# Patient Record
Sex: FEMALE | Race: ASIAN | NOT HISPANIC OR LATINO | URBAN - METROPOLITAN AREA
[De-identification: names, ages, dates, MRNs, and addresses within clinical notes are randomized per-mention and may not be internally consistent; named-entity substitution may affect disease eponyms.]

---

## 2017-01-17 ENCOUNTER — EMERGENCY (EMERGENCY)
Facility: HOSPITAL | Age: 27
LOS: 1 days | Discharge: HOME | End: 2017-01-17
Attending: EMERGENCY MEDICINE | Admitting: EMERGENCY MEDICINE
Payer: COMMERCIAL

## 2017-01-17 VITALS
HEART RATE: 71 BPM | OXYGEN SATURATION: 100 % | SYSTOLIC BLOOD PRESSURE: 121 MMHG | DIASTOLIC BLOOD PRESSURE: 83 MMHG | TEMPERATURE: 99 F | RESPIRATION RATE: 15 BRPM

## 2017-01-17 LAB
APTT BLD: 35.3 SEC — SIGNIFICANT CHANGE UP (ref 27.5–37.4)
HCT VFR BLD CALC: 43 % — SIGNIFICANT CHANGE UP (ref 34.5–45)
HGB BLD-MCNC: 14.3 G/DL — SIGNIFICANT CHANGE UP (ref 11.5–15.5)
INR BLD: 1.42 — HIGH (ref 0.87–1.18)
MCHC RBC-ENTMCNC: 28.9 PG — SIGNIFICANT CHANGE UP (ref 27–34)
MCHC RBC-ENTMCNC: 33.3 % — SIGNIFICANT CHANGE UP (ref 32–36)
MCV RBC AUTO: 87 FL — SIGNIFICANT CHANGE UP (ref 80–100)
PLATELET # BLD AUTO: 243 K/UL — SIGNIFICANT CHANGE UP (ref 150–400)
PMV BLD: 9.4 FL — SIGNIFICANT CHANGE UP (ref 7–13)
PROTHROM AB SERPL-ACNC: 16.2 SEC — HIGH (ref 10–13.1)
RBC # BLD: 4.94 M/UL — SIGNIFICANT CHANGE UP (ref 3.8–5.2)
RBC # FLD: 12.5 % — SIGNIFICANT CHANGE UP (ref 10.3–14.5)
WBC # BLD: 7.6 K/UL — SIGNIFICANT CHANGE UP (ref 3.8–10.5)
WBC # FLD AUTO: 7.6 K/UL — SIGNIFICANT CHANGE UP (ref 3.8–10.5)

## 2017-01-17 PROCEDURE — 99283 EMERGENCY DEPT VISIT LOW MDM: CPT

## 2017-01-17 RX ORDER — FLUTICASONE PROPIONATE 50 MCG
2 SPRAY, SUSPENSION NASAL
Qty: 1 | Refills: 0
Start: 2017-01-17 | End: 2017-01-24

## 2017-01-17 NOTE — ED PROVIDER NOTE - NS ED MD SCRIBE ATTENDING SCRIBE SECTIONS
HISTORY OF PRESENT ILLNESS/REVIEW OF SYSTEMS/PAST MEDICAL/SURGICAL/SOCIAL HISTORY/DISPOSITION/HIV/PHYSICAL EXAM/VITAL SIGNS( Pullset)

## 2017-01-17 NOTE — ED PROVIDER NOTE - OBJECTIVE STATEMENT
26 y/o F pt with no significant PMHx c/o intermittent nosebleeds x6 days since she seen her ENT with associated dizziness and nose pain. Currently not bleeding. Pt saw ENT associated with [facility] in the [facility], Dr. Lachman Leigh [phone] during the onset of the sx because she had hx of nose bleeds and inability to breath through her nose. States ENT numbed the area and cleaned it to see the reason for nosebleeds. Notes that the nosebleeds increased since the visit with suspected blood clots. Was Rx'd mupirocin, afrin, nasal saline, cefuroxime. Pt called ENT and referred pt to the ED for a CT scan and bleeding blood test. Pt dips a cotton ball in afrin and dips it in her nose and leans forward during her nosebleeds as instructed by ENT. Pt gets heavy menstruation periods up to 7 days, about 2 pads a day. Denies CP, chance of pregnancy or any other complaints. NKDA

## 2017-01-17 NOTE — ED PROVIDER NOTE - CARE PLAN
Principal Discharge DX:	Epistaxis  Instructions for follow-up, activity and diet:	Follow up with our ENT clinic within 48 hours for further evaluation. Call tomorrow to set up the appointment to be seen, they are aware you will be calling. [phone]. Take a copy of all lab results to be reviewed.  Take Flonase 2 sprays per nostril daily.  If any recurrent bleeding, worsening, concerning, or new signs or symptoms return to the ER

## 2017-01-17 NOTE — ED PROVIDER NOTE - PLAN OF CARE
Follow up with our ENT clinic within 48 hours for further evaluation. Call tomorrow to set up the appointment to be seen, they are aware you will be calling. [phone]. Take a copy of all lab results to be reviewed.  Take Flonase 2 sprays per nostril daily.  If any recurrent bleeding, worsening, concerning, or new signs or symptoms return to the ER

## 2017-01-17 NOTE — ED PROVIDER NOTE - PROGRESS NOTE DETAILS
DR bloch- Discussed with ENT to f/u in clinic for MRI. continue present meds. DR bloch- Discussed with ENT to f/u in clinic for MRI. continue present meds and add flonase Rwd labs with Dr bloch, ok for pt to fu in ENT clinic/flonase to pharmacy. No active bleeding.
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
## 2017-01-17 NOTE — ED PROVIDER NOTE - MEDICAL DECISION MAKING DETAILS
28 y/o F pt presents to the ED with intermittent nosebleeds x6 days. Reoccurring nose bleeds. Consider checking platelets, PT, PTT, coagulapathy, contact ENT for usage of nasal steroids and ENT f/u

## 2017-01-17 NOTE — ED ADULT TRIAGE NOTE - CHIEF COMPLAINT QUOTE
c/o on and off nosebleeds for 3 weeks , not actively bleeding now. seen ENT 4 days ago, advised to come to ED for further eval . denies pmh

## 2017-02-06 ENCOUNTER — EMERGENCY (EMERGENCY)
Facility: HOSPITAL | Age: 27
LOS: 1 days | Discharge: HOME | End: 2017-02-06
Attending: EMERGENCY MEDICINE | Admitting: EMERGENCY MEDICINE
Payer: COMMERCIAL

## 2017-02-06 VITALS — TEMPERATURE: 98 F | OXYGEN SATURATION: 97 %

## 2017-02-06 VITALS
SYSTOLIC BLOOD PRESSURE: 108 MMHG | RESPIRATION RATE: 18 BRPM | HEART RATE: 89 BPM | DIASTOLIC BLOOD PRESSURE: 74 MMHG | OXYGEN SATURATION: 100 %

## 2017-02-06 LAB
ALBUMIN SERPL ELPH-MCNC: 4.4 G/DL — SIGNIFICANT CHANGE UP (ref 3.3–5)
ALP SERPL-CCNC: 58 U/L — SIGNIFICANT CHANGE UP (ref 40–120)
ALT FLD-CCNC: 14 U/L — SIGNIFICANT CHANGE UP (ref 4–33)
APPEARANCE UR: CLEAR — SIGNIFICANT CHANGE UP
APTT BLD: 35.1 SEC — SIGNIFICANT CHANGE UP (ref 27.5–37.4)
AST SERPL-CCNC: 18 U/L — SIGNIFICANT CHANGE UP (ref 4–32)
BASOPHILS # BLD AUTO: 0 K/UL — SIGNIFICANT CHANGE UP (ref 0–0.2)
BASOPHILS NFR BLD AUTO: 0 % — SIGNIFICANT CHANGE UP (ref 0–2)
BILIRUB SERPL-MCNC: 0.4 MG/DL — SIGNIFICANT CHANGE UP (ref 0.2–1.2)
BILIRUB UR-MCNC: NEGATIVE — SIGNIFICANT CHANGE UP
BLOOD UR QL VISUAL: NEGATIVE — SIGNIFICANT CHANGE UP
BUN SERPL-MCNC: 12 MG/DL — SIGNIFICANT CHANGE UP (ref 7–23)
CALCIUM SERPL-MCNC: 9.6 MG/DL — SIGNIFICANT CHANGE UP (ref 8.4–10.5)
CHLORIDE SERPL-SCNC: 103 MMOL/L — SIGNIFICANT CHANGE UP (ref 98–107)
CO2 SERPL-SCNC: 24 MMOL/L — SIGNIFICANT CHANGE UP (ref 22–31)
COLOR SPEC: SIGNIFICANT CHANGE UP
CREAT SERPL-MCNC: 0.64 MG/DL — SIGNIFICANT CHANGE UP (ref 0.5–1.3)
EOSINOPHIL # BLD AUTO: 0.07 K/UL — SIGNIFICANT CHANGE UP (ref 0–0.5)
EOSINOPHIL NFR BLD AUTO: 1.2 % — SIGNIFICANT CHANGE UP (ref 0–6)
GLUCOSE SERPL-MCNC: 85 MG/DL — SIGNIFICANT CHANGE UP (ref 70–99)
GLUCOSE UR-MCNC: NEGATIVE — SIGNIFICANT CHANGE UP
HCT VFR BLD CALC: 42.5 % — SIGNIFICANT CHANGE UP (ref 34.5–45)
HGB BLD-MCNC: 14 G/DL — SIGNIFICANT CHANGE UP (ref 11.5–15.5)
IMM GRANULOCYTES NFR BLD AUTO: 0.2 % — SIGNIFICANT CHANGE UP (ref 0–1.5)
INR BLD: 1.43 — HIGH (ref 0.87–1.18)
KETONES UR-MCNC: NEGATIVE — SIGNIFICANT CHANGE UP
LEUKOCYTE ESTERASE UR-ACNC: NEGATIVE — SIGNIFICANT CHANGE UP
LYMPHOCYTES # BLD AUTO: 1.54 K/UL — SIGNIFICANT CHANGE UP (ref 1–3.3)
LYMPHOCYTES # BLD AUTO: 26.4 % — SIGNIFICANT CHANGE UP (ref 13–44)
MCHC RBC-ENTMCNC: 29 PG — SIGNIFICANT CHANGE UP (ref 27–34)
MCHC RBC-ENTMCNC: 32.9 % — SIGNIFICANT CHANGE UP (ref 32–36)
MCV RBC AUTO: 88.2 FL — SIGNIFICANT CHANGE UP (ref 80–100)
MONOCYTES # BLD AUTO: 0.34 K/UL — SIGNIFICANT CHANGE UP (ref 0–0.9)
MONOCYTES NFR BLD AUTO: 5.8 % — SIGNIFICANT CHANGE UP (ref 2–14)
MUCOUS THREADS # UR AUTO: SIGNIFICANT CHANGE UP
NEUTROPHILS # BLD AUTO: 3.88 K/UL — SIGNIFICANT CHANGE UP (ref 1.8–7.4)
NEUTROPHILS NFR BLD AUTO: 66.4 % — SIGNIFICANT CHANGE UP (ref 43–77)
NITRITE UR-MCNC: NEGATIVE — SIGNIFICANT CHANGE UP
OB PNL STL: NEGATIVE — SIGNIFICANT CHANGE UP
PH UR: 6.5 — SIGNIFICANT CHANGE UP (ref 4.6–8)
PLATELET # BLD AUTO: 281 K/UL — SIGNIFICANT CHANGE UP (ref 150–400)
PMV BLD: 9.7 FL — SIGNIFICANT CHANGE UP (ref 7–13)
POTASSIUM SERPL-MCNC: 3.8 MMOL/L — SIGNIFICANT CHANGE UP (ref 3.5–5.3)
POTASSIUM SERPL-SCNC: 3.8 MMOL/L — SIGNIFICANT CHANGE UP (ref 3.5–5.3)
PROT SERPL-MCNC: 7.6 G/DL — SIGNIFICANT CHANGE UP (ref 6–8.3)
PROT UR-MCNC: NEGATIVE — SIGNIFICANT CHANGE UP
PROTHROM AB SERPL-ACNC: 16.4 SEC — HIGH (ref 10–13.1)
RBC # BLD: 4.82 M/UL — SIGNIFICANT CHANGE UP (ref 3.8–5.2)
RBC # FLD: 12.4 % — SIGNIFICANT CHANGE UP (ref 10.3–14.5)
SODIUM SERPL-SCNC: 140 MMOL/L — SIGNIFICANT CHANGE UP (ref 135–145)
SP GR SPEC: 1.01 — SIGNIFICANT CHANGE UP (ref 1–1.03)
SQUAMOUS # UR AUTO: SIGNIFICANT CHANGE UP
UROBILINOGEN FLD QL: NORMAL E.U. — SIGNIFICANT CHANGE UP (ref 0.1–0.2)
WBC # BLD: 5.84 K/UL — SIGNIFICANT CHANGE UP (ref 3.8–10.5)
WBC # FLD AUTO: 5.84 K/UL — SIGNIFICANT CHANGE UP (ref 3.8–10.5)
WBC UR QL: SIGNIFICANT CHANGE UP (ref 0–?)

## 2017-02-06 PROCEDURE — 99284 EMERGENCY DEPT VISIT MOD MDM: CPT

## 2017-02-06 PROCEDURE — 74177 CT ABD & PELVIS W/CONTRAST: CPT | Mod: 26

## 2017-02-06 RX ORDER — ACETAMINOPHEN 500 MG
650 TABLET ORAL ONCE
Qty: 0 | Refills: 0 | Status: COMPLETED | OUTPATIENT
Start: 2017-02-06 | End: 2017-02-06

## 2017-02-06 RX ADMIN — Medication 650 MILLIGRAM(S): at 21:28

## 2017-02-06 NOTE — ED PROVIDER NOTE - ATTENDING CONTRIBUTION TO CARE
AJM: pt seen with PA and agree with above note. 28 y/o female with no pmhx presents to ED c/o daily episodes of epistaxis x 1 month; no active bleeding. Pt also c/o generalized abdominal pain, worse in RRLQ for two days. No trauma or abd surgeries in the past. + ttp in RLQ on exam. Questionable blood in stool, possibly from chronic epistaxis. Labs, ct a/p, reassess. encourage ent follow up as well as heme/onc follow up given chronic bleeding.

## 2017-02-06 NOTE — ED PROVIDER NOTE - MEDICAL DECISION MAKING DETAILS
26 y/o female with no pmhx presents to ED c/o daily episodes of epistaxis x 1 month. Pt also c/o generalized abdominal pain and melena x 2 days. CBC, CMP, PT/PTT, INR, U/A, ucg, CT with IV and PO contrast, stool guiac. Pt currently stable, most likely outpatient GI, ENT and heme-onc follow up. 28 y/o female with no pmhx presents to ED c/o daily episodes of epistaxis x 1 month; no acitive bleeding. Pt also c/o generalized abdominal pain and melena x 2 days. CBC, CMP, PT/PTT, INR, U/A, ucg, CT with IV and PO contrast, stool guiac. Pt currently stable, most likely outpatient GI, ENT and heme-onc follow up.

## 2017-02-06 NOTE — ED PROVIDER NOTE - OBJECTIVE STATEMENT
28 y/o female with no pmhx presents to ED c/o daily episodes of epistaxis x 1 month. Pt states she was seen in the ER on 1/17/17 for her nose bleeds and followed up with an ENT Dr. Lachman who cauterized her nose three times. Pt states she has been using normal saline to flush her nose, applying pressure and leaning forward as her ENT recommended, however has no relief. Epistaxis episodes last from 10minutes to 1 hour. About 2 episodes per day with bright red blood. Denies any active bleeding, medications taken at home, anticoagulation, palpitations, family hx of bleeding disorders.   Pt also c/o generalized abdominal pain and dark red bloody stools x 2 days. Pt has constant dull lower abdominal pain with intermittent sharp pain. Pain fluctuating between 6/10 and 8/10. Admits to diarrhea once a week x 1 year. LBM was this morning which was regular. Denies fever, chills, CP, heartburn, N/V, dysuria, back pain. LMP 1/22/17, admits to "heavy periods" having to use 3 pads a day and bleeding for 4-7 days. 28 y/o female with no pmhx presents to ED c/o daily episodes of epistaxis x 1 month. Pt states she was seen in the ER on 1/17/17 for her nose bleeds and followed up with an ENT Dr. Lachman who cauterized her nose three times. Pt states she has been using normal saline to flush her nose, applying pressure and leaning forward as her ENT recommended, however has no relief. Epistaxis episodes last from 10minutes to 1 hour. About 1- 2 episodes per day with bright red blood. Denies any active bleeding, medications taken at home, anticoagulation, palpitations, family hx of bleeding disorders.   Pt also c/o generalized abdominal pain and dark red bloody stools x 2 days. Pt has constant dull lower abdominal pain with intermittent sharp pain. Pain fluctuating between 6/10 and 8/10. Admits to diarrhea once a week x 1 year. LBM was this morning which was regular. Denies fever, chills, CP, heartburn, N/V, dysuria, back pain. LMP 1/22/17, admits to "heavy periods" having to use 3 pads a day and bleeding for 4-7 days.

## 2017-02-06 NOTE — ED PROVIDER NOTE - ENMT NEGATIVE STATEMENT, MLM
Ears: no ear pain and no hearing problems. Nose: no nasal congestion and no nasal drainage. Mouth/Throat: no dysphagia, no hoarseness and no throat pain, +epistaxis. Neck: no lumps, no pain, no stiffness and no swollen glands.

## 2017-02-06 NOTE — ED PROVIDER NOTE - CARE PLAN
Instructions for follow-up, activity and diet:	Follow up with your primary care doctor within 48-72 hours. Follow up with gastroenterology, hematology to r/o bleeding disorder and ENT for epistaxis within the next week. Return to ER if experiencing worsening symptoms, fever, chills, vomiting or any other concerns. Principal Discharge DX:	Cyst of right ovary  Instructions for follow-up, activity and diet:	Follow up with your primary care doctor within 48-72 hours. Follow up with gastroenterology, hematology to r/o bleeding disorder and ENT for epistaxis within the next week. Return to ER if experiencing worsening symptoms, fever, chills, vomiting or any other concerns.

## 2017-02-06 NOTE — ED ADULT TRIAGE NOTE - CHIEF COMPLAINT QUOTE
Patient states that she gets 45 minutes of nosebleeds (last one 20 minutes ago). Patient also c/o right sided pelvic pain.

## 2017-02-06 NOTE — ED PROVIDER NOTE - PLAN OF CARE
Follow up with your primary care doctor within 48-72 hours. Follow up with gastroenterology, hematology to r/o bleeding disorder and ENT for epistaxis within the next week. Return to ER if experiencing worsening symptoms, fever, chills, vomiting or any other concerns.

## 2017-02-06 NOTE — ED PROVIDER NOTE - PROGRESS NOTE DETAILS
VINCE Morton amenable to plan of care. Awaiting labs and CT scan results. VINCE PINTO: I have personally seen and examined this patient. I have reviewed and addended the HPI, ROS, PE, and A/P as necessary. I agree with the above plan of care. PA Gerasimou - Pt currently stable and abdominal pain has improved. No active bleeding or epistaxis. Signed out and discussed plan of care with Nathaly. Awaiting CT scan results. VINCE Liu: Pt signed out to me by VINCE Aranda, plan to f/u CT Scan and d/c home if neg with hem/gi/ent  follow up as requested by patient. Patient CT stable , 2.7 R ovarian corpuse luteal cyst. Will also give GYN referral list. Discussed results with patient. Pt stable , no active bleeding, pain improved. Return precautions given. Pt agrees and understands plan.

## 2017-02-06 NOTE — ED PROVIDER NOTE - ABDOMINAL EXAM
soft/+BS. Generalized tenderness on light palpation in all 4 quadrants, tender in RMQ on deep palpation. Negative Acharya's. No rebound tenderness. +guarding soft/+BS. Generalized tenderness on light palpation in all 4 quadrants, tender in RMQ on deep palpation. Negative Acharya's. No rebound tenderness. +guarding/nondistended/tender...

## 2017-02-07 ENCOUNTER — RESULT REVIEW (OUTPATIENT)
Age: 27
End: 2017-02-07

## 2017-02-08 ENCOUNTER — APPOINTMENT (OUTPATIENT)
Dept: HEMATOLOGY ONCOLOGY | Facility: CLINIC | Age: 27
End: 2017-02-08

## 2017-02-08 ENCOUNTER — OUTPATIENT (OUTPATIENT)
Dept: OUTPATIENT SERVICES | Facility: HOSPITAL | Age: 27
LOS: 1 days | Discharge: HOME | End: 2017-02-08

## 2017-02-08 VITALS
TEMPERATURE: 98.1 F | BODY MASS INDEX: 20.44 KG/M2 | HEART RATE: 93 BPM | OXYGEN SATURATION: 100 % | DIASTOLIC BLOOD PRESSURE: 74 MMHG | WEIGHT: 116.82 LBS | RESPIRATION RATE: 16 BRPM | SYSTOLIC BLOOD PRESSURE: 105 MMHG | HEIGHT: 63.39 IN

## 2017-02-08 DIAGNOSIS — Z87.42 PERSONAL HISTORY OF OTHER DISEASES OF THE FEMALE GENITAL TRACT: ICD-10-CM

## 2017-02-08 DIAGNOSIS — R79.1 ABNORMAL COAGULATION PROFILE: ICD-10-CM

## 2017-02-08 LAB
BASOPHILS # BLD AUTO: 0 K/UL — SIGNIFICANT CHANGE UP (ref 0–0.2)
BASOPHILS NFR BLD AUTO: 0.2 % — SIGNIFICANT CHANGE UP (ref 0–2)
EOSINOPHIL # BLD AUTO: 0.2 K/UL — SIGNIFICANT CHANGE UP (ref 0–0.5)
EOSINOPHIL NFR BLD AUTO: 3.5 % — SIGNIFICANT CHANGE UP (ref 0–6)
HCT VFR BLD CALC: 42 % — SIGNIFICANT CHANGE UP (ref 34.5–45)
HGB BLD-MCNC: 13.7 G/DL — SIGNIFICANT CHANGE UP (ref 11.5–15.5)
LYMPHOCYTES # BLD AUTO: 1.4 K/UL — SIGNIFICANT CHANGE UP (ref 1–3.3)
LYMPHOCYTES # BLD AUTO: 26.4 % — SIGNIFICANT CHANGE UP (ref 13–44)
MCHC RBC-ENTMCNC: 28.7 PG — SIGNIFICANT CHANGE UP (ref 27–34)
MCHC RBC-ENTMCNC: 32.6 GM/DL — SIGNIFICANT CHANGE UP (ref 32–36)
MCV RBC AUTO: 87.9 FL — SIGNIFICANT CHANGE UP (ref 80–100)
MONOCYTES # BLD AUTO: 0.4 K/UL — SIGNIFICANT CHANGE UP (ref 0–0.9)
MONOCYTES NFR BLD AUTO: 7.7 % — SIGNIFICANT CHANGE UP (ref 2–14)
NEUTROPHILS # BLD AUTO: 3.4 K/UL — SIGNIFICANT CHANGE UP (ref 1.8–7.4)
NEUTROPHILS NFR BLD AUTO: 62.3 % — SIGNIFICANT CHANGE UP (ref 43–77)
PLATELET # BLD AUTO: 275 K/UL — SIGNIFICANT CHANGE UP (ref 150–400)
RBC # BLD: 4.78 M/UL — SIGNIFICANT CHANGE UP (ref 3.8–5.2)
RBC # FLD: 11.2 % — SIGNIFICANT CHANGE UP (ref 10.3–14.5)
TT CONT PPP: 19.5 SECS
WBC # BLD: 5.4 K/UL — SIGNIFICANT CHANGE UP (ref 3.8–10.5)
WBC # FLD AUTO: 5.4 K/UL — SIGNIFICANT CHANGE UP (ref 3.8–10.5)

## 2017-02-09 ENCOUNTER — APPOINTMENT (OUTPATIENT)
Dept: OTOLARYNGOLOGY | Facility: CLINIC | Age: 27
End: 2017-02-09

## 2017-02-10 LAB
DEPRECATED KAPPA LC FREE/LAMBDA SER: 0.93 RATIO
FACT VII ACT/NOR PPP: 27 %
IGA SER QL IEP: 162 MG/DL
IGG SER QL IEP: 1370 MG/DL
IGM SER QL IEP: 98 MG/DL
KAPPA LC CSF-MCNC: 1.38 MG/DL
KAPPA LC SERPL-MCNC: 1.29 MG/DL
M PROTEIN SPEC IFE-MCNC: NORMAL

## 2017-02-14 ENCOUNTER — APPOINTMENT (OUTPATIENT)
Dept: OBGYN | Facility: CLINIC | Age: 27
End: 2017-02-14

## 2017-02-19 ENCOUNTER — EMERGENCY (EMERGENCY)
Facility: HOSPITAL | Age: 27
LOS: 1 days | Discharge: HOME | End: 2017-02-19
Attending: EMERGENCY MEDICINE | Admitting: EMERGENCY MEDICINE
Payer: COMMERCIAL

## 2017-02-19 VITALS
TEMPERATURE: 98 F | DIASTOLIC BLOOD PRESSURE: 82 MMHG | OXYGEN SATURATION: 100 % | RESPIRATION RATE: 17 BRPM | HEART RATE: 93 BPM | SYSTOLIC BLOOD PRESSURE: 120 MMHG

## 2017-02-19 LAB
BASOPHILS # BLD AUTO: 0.01 K/UL — SIGNIFICANT CHANGE UP (ref 0–0.2)
BASOPHILS NFR BLD AUTO: 0.2 % — SIGNIFICANT CHANGE UP (ref 0–2)
BUN SERPL-MCNC: 13 MG/DL — SIGNIFICANT CHANGE UP (ref 7–23)
CALCIUM SERPL-MCNC: 8.9 MG/DL — SIGNIFICANT CHANGE UP (ref 8.4–10.5)
CHLORIDE SERPL-SCNC: 105 MMOL/L — SIGNIFICANT CHANGE UP (ref 98–107)
CO2 SERPL-SCNC: 19 MMOL/L — LOW (ref 22–31)
CREAT SERPL-MCNC: 0.65 MG/DL — SIGNIFICANT CHANGE UP (ref 0.5–1.3)
EOSINOPHIL # BLD AUTO: 0.09 K/UL — SIGNIFICANT CHANGE UP (ref 0–0.5)
EOSINOPHIL NFR BLD AUTO: 1.5 % — SIGNIFICANT CHANGE UP (ref 0–6)
GLUCOSE SERPL-MCNC: 78 MG/DL — SIGNIFICANT CHANGE UP (ref 70–99)
HCT VFR BLD CALC: 39.4 % — SIGNIFICANT CHANGE UP (ref 34.5–45)
HGB BLD-MCNC: 13.1 G/DL — SIGNIFICANT CHANGE UP (ref 11.5–15.5)
IMM GRANULOCYTES NFR BLD AUTO: 0.2 % — SIGNIFICANT CHANGE UP (ref 0–1.5)
LYMPHOCYTES # BLD AUTO: 1.59 K/UL — SIGNIFICANT CHANGE UP (ref 1–3.3)
LYMPHOCYTES # BLD AUTO: 26.3 % — SIGNIFICANT CHANGE UP (ref 13–44)
MCHC RBC-ENTMCNC: 29.2 PG — SIGNIFICANT CHANGE UP (ref 27–34)
MCHC RBC-ENTMCNC: 33.2 % — SIGNIFICANT CHANGE UP (ref 32–36)
MCV RBC AUTO: 87.9 FL — SIGNIFICANT CHANGE UP (ref 80–100)
MONOCYTES # BLD AUTO: 0.3 K/UL — SIGNIFICANT CHANGE UP (ref 0–0.9)
MONOCYTES NFR BLD AUTO: 5 % — SIGNIFICANT CHANGE UP (ref 2–14)
NEUTROPHILS # BLD AUTO: 4.04 K/UL — SIGNIFICANT CHANGE UP (ref 1.8–7.4)
NEUTROPHILS NFR BLD AUTO: 66.8 % — SIGNIFICANT CHANGE UP (ref 43–77)
PLATELET # BLD AUTO: 246 K/UL — SIGNIFICANT CHANGE UP (ref 150–400)
PMV BLD: 9.7 FL — SIGNIFICANT CHANGE UP (ref 7–13)
POTASSIUM SERPL-MCNC: 4 MMOL/L — SIGNIFICANT CHANGE UP (ref 3.5–5.3)
POTASSIUM SERPL-SCNC: 4 MMOL/L — SIGNIFICANT CHANGE UP (ref 3.5–5.3)
RBC # BLD: 4.48 M/UL — SIGNIFICANT CHANGE UP (ref 3.8–5.2)
RBC # FLD: 12.6 % — SIGNIFICANT CHANGE UP (ref 10.3–14.5)
SODIUM SERPL-SCNC: 139 MMOL/L — SIGNIFICANT CHANGE UP (ref 135–145)
WBC # BLD: 6.04 K/UL — SIGNIFICANT CHANGE UP (ref 3.8–10.5)
WBC # FLD AUTO: 6.04 K/UL — SIGNIFICANT CHANGE UP (ref 3.8–10.5)

## 2017-02-19 PROCEDURE — 99284 EMERGENCY DEPT VISIT MOD MDM: CPT

## 2017-02-19 RX ORDER — LOPERAMIDE HCL 2 MG
1 TABLET ORAL
Qty: 15 | Refills: 0
Start: 2017-02-19

## 2017-02-19 RX ORDER — LOPERAMIDE HCL 2 MG
4 TABLET ORAL ONCE
Qty: 0 | Refills: 0 | Status: COMPLETED | OUTPATIENT
Start: 2017-02-19 | End: 2017-02-19

## 2017-02-19 RX ADMIN — Medication 4 MILLIGRAM(S): at 15:08

## 2017-02-19 NOTE — ED PROVIDER NOTE - OBJECTIVE STATEMENT
28 y/o F with no significant PMHx presents to the ED complaining of loose stools x6 weeks. Has been on spinach/kale juice diet because she was told her INR is high by her PMD. Denies fevers, chills, bloody/black stools, mucous in stool, weakness, dysuria. States she has white vaginal discharge for which she was seen GYN last week; was told there were no issues. Denies vaginal bleeding. LNMP 5 weeks ago. Denies recent travel, sick contacts. Pt is due for GI followup in 2 days. 26 y/o F with no significant PMHx presents to the ED complaining of loose stools x6 weeks, about 2-3 episodes daily. Has been on spinach/kale juice diet because she was told her INR is high by her PMD. Denies fevers, chills, bloody/black stools, mucous in stool, weakness, dysuria. States she has white vaginal discharge for which she was seen GYN last week; was told there were no issues. Denies vaginal bleeding. LNMP 5 weeks ago. Denies recent travel, sick contacts. Pt is due for GI followup in 2 days, first visit.

## 2017-02-19 NOTE — ED PROVIDER NOTE - DETAILS:
The scribe's documentation has been prepared under my direction and personally reviewed by me in its entirety. I confirm that the note above accurately reflects all work, treatment, procedures, and medical decision making performed by me (Dr. Tolbert).

## 2017-02-19 NOTE — ED PROVIDER NOTE - CARE PLAN
Principal Discharge DX:	Diarrhea Principal Discharge DX:	Diarrhea  Instructions for follow-up, activity and diet:	You were seen today for your diarrhea.  Drink plenty of fluids.  Take the prescribed diarrhea medication as needed.  Be sure to follow up with your primary care physician in 2-3 days for re-evaluation.  RETURN TO THE EMERGENCY DEPARTMENT IMMEDIATELY IF YOU CANNOT EAT OR DRINK, HAVE SEVERE ABDOMINAL PAIN, OR FOR ANY OTHER CONCERN.

## 2017-02-19 NOTE — ED PROVIDER NOTE - NS ED MD SCRIBE ATTENDING SCRIBE SECTIONS
HISTORY OF PRESENT ILLNESS/DISPOSITION/HIV/PAST MEDICAL/SURGICAL/SOCIAL HISTORY/PHYSICAL EXAM/REVIEW OF SYSTEMS/VITAL SIGNS( Pullset)

## 2017-02-19 NOTE — ED ADULT TRIAGE NOTE - CHIEF COMPLAINT QUOTE
pt c/o of loose "ribbon" like stool intermittent since January. states she has been juice cleansing with spinach and kale since her doctor told her that her vitamin K was low.

## 2017-02-19 NOTE — ED PROVIDER NOTE - PLAN OF CARE
You were seen today for your diarrhea.  Drink plenty of fluids.  Take the prescribed diarrhea medication as needed.  Be sure to follow up with your primary care physician in 2-3 days for re-evaluation.  RETURN TO THE EMERGENCY DEPARTMENT IMMEDIATELY IF YOU CANNOT EAT OR DRINK, HAVE SEVERE ABDOMINAL PAIN, OR FOR ANY OTHER CONCERN.

## 2017-02-19 NOTE — ED PROVIDER NOTE - MEDICAL DECISION MAKING DETAILS
28 y/o F with loose stools since January. Suspect IBS. Doubt invasive bacterial enteritis as no blood or mucous in stool, and non-toxic appearing. Doubt C diff as no recent antibiotic use. Will obtain ova and parasites if pt able to provide stool sample, check electrolytes, give antimotility agent, discharge home.

## 2017-02-20 ENCOUNTER — EMERGENCY (EMERGENCY)
Facility: HOSPITAL | Age: 27
LOS: 1 days | Discharge: HOME | End: 2017-02-20
Attending: EMERGENCY MEDICINE | Admitting: EMERGENCY MEDICINE
Payer: COMMERCIAL

## 2017-02-20 VITALS
OXYGEN SATURATION: 100 % | DIASTOLIC BLOOD PRESSURE: 89 MMHG | RESPIRATION RATE: 16 BRPM | TEMPERATURE: 97 F | HEART RATE: 105 BPM | SYSTOLIC BLOOD PRESSURE: 122 MMHG

## 2017-02-20 LAB
ALBUMIN SERPL ELPH-MCNC: 4.2 G/DL — SIGNIFICANT CHANGE UP (ref 3.3–5)
ALP SERPL-CCNC: 49 U/L — SIGNIFICANT CHANGE UP (ref 40–120)
ALT FLD-CCNC: 9 U/L — SIGNIFICANT CHANGE UP (ref 4–33)
APPEARANCE UR: CLEAR — SIGNIFICANT CHANGE UP
AST SERPL-CCNC: 21 U/L — SIGNIFICANT CHANGE UP (ref 4–32)
BASOPHILS # BLD AUTO: 0.01 K/UL — SIGNIFICANT CHANGE UP (ref 0–0.2)
BASOPHILS NFR BLD AUTO: 0.1 % — SIGNIFICANT CHANGE UP (ref 0–2)
BILIRUB SERPL-MCNC: 0.4 MG/DL — SIGNIFICANT CHANGE UP (ref 0.2–1.2)
BILIRUB UR-MCNC: NEGATIVE — SIGNIFICANT CHANGE UP
BLOOD UR QL VISUAL: NEGATIVE — SIGNIFICANT CHANGE UP
BUN SERPL-MCNC: 16 MG/DL — SIGNIFICANT CHANGE UP (ref 7–23)
C TRACH RRNA SPEC QL NAA+PROBE: SIGNIFICANT CHANGE UP
CALCIUM SERPL-MCNC: 9.4 MG/DL — SIGNIFICANT CHANGE UP (ref 8.4–10.5)
CHLORIDE SERPL-SCNC: 104 MMOL/L — SIGNIFICANT CHANGE UP (ref 98–107)
CO2 SERPL-SCNC: 21 MMOL/L — LOW (ref 22–31)
COLOR SPEC: YELLOW — SIGNIFICANT CHANGE UP
CREAT SERPL-MCNC: 0.65 MG/DL — SIGNIFICANT CHANGE UP (ref 0.5–1.3)
EOSINOPHIL # BLD AUTO: 0.09 K/UL — SIGNIFICANT CHANGE UP (ref 0–0.5)
EOSINOPHIL NFR BLD AUTO: 1.2 % — SIGNIFICANT CHANGE UP (ref 0–6)
GLUCOSE SERPL-MCNC: 71 MG/DL — SIGNIFICANT CHANGE UP (ref 70–99)
GLUCOSE UR-MCNC: NEGATIVE — SIGNIFICANT CHANGE UP
HCG UR-SCNC: NEGATIVE — SIGNIFICANT CHANGE UP
HCT VFR BLD CALC: 40.3 % — SIGNIFICANT CHANGE UP (ref 34.5–45)
HGB BLD-MCNC: 13.5 G/DL — SIGNIFICANT CHANGE UP (ref 11.5–15.5)
HYALINE CASTS # UR AUTO: SIGNIFICANT CHANGE UP (ref 0–?)
IMM GRANULOCYTES NFR BLD AUTO: 0.3 % — SIGNIFICANT CHANGE UP (ref 0–1.5)
KETONES UR-MCNC: SIGNIFICANT CHANGE UP
LEUKOCYTE ESTERASE UR-ACNC: NEGATIVE — SIGNIFICANT CHANGE UP
LYMPHOCYTES # BLD AUTO: 1.89 K/UL — SIGNIFICANT CHANGE UP (ref 1–3.3)
LYMPHOCYTES # BLD AUTO: 25.8 % — SIGNIFICANT CHANGE UP (ref 13–44)
MCHC RBC-ENTMCNC: 29 PG — SIGNIFICANT CHANGE UP (ref 27–34)
MCHC RBC-ENTMCNC: 33.5 % — SIGNIFICANT CHANGE UP (ref 32–36)
MCV RBC AUTO: 86.7 FL — SIGNIFICANT CHANGE UP (ref 80–100)
MONOCYTES # BLD AUTO: 0.55 K/UL — SIGNIFICANT CHANGE UP (ref 0–0.9)
MONOCYTES NFR BLD AUTO: 7.5 % — SIGNIFICANT CHANGE UP (ref 2–14)
MUCOUS THREADS # UR AUTO: SIGNIFICANT CHANGE UP
N GONORRHOEA RRNA SPEC QL NAA+PROBE: SIGNIFICANT CHANGE UP
NEUTROPHILS # BLD AUTO: 4.77 K/UL — SIGNIFICANT CHANGE UP (ref 1.8–7.4)
NEUTROPHILS NFR BLD AUTO: 65.1 % — SIGNIFICANT CHANGE UP (ref 43–77)
NITRITE UR-MCNC: NEGATIVE — SIGNIFICANT CHANGE UP
PH UR: 6 — SIGNIFICANT CHANGE UP (ref 4.6–8)
PLATELET # BLD AUTO: 263 K/UL — SIGNIFICANT CHANGE UP (ref 150–400)
PMV BLD: 9.2 FL — SIGNIFICANT CHANGE UP (ref 7–13)
POTASSIUM SERPL-MCNC: 4 MMOL/L — SIGNIFICANT CHANGE UP (ref 3.5–5.3)
POTASSIUM SERPL-SCNC: 4 MMOL/L — SIGNIFICANT CHANGE UP (ref 3.5–5.3)
PROT SERPL-MCNC: 7.2 G/DL — SIGNIFICANT CHANGE UP (ref 6–8.3)
PROT UR-MCNC: 10 — SIGNIFICANT CHANGE UP
RBC # BLD: 4.65 M/UL — SIGNIFICANT CHANGE UP (ref 3.8–5.2)
RBC # FLD: 12.4 % — SIGNIFICANT CHANGE UP (ref 10.3–14.5)
RBC CASTS # UR COMP ASSIST: SIGNIFICANT CHANGE UP (ref 0–?)
SODIUM SERPL-SCNC: 140 MMOL/L — SIGNIFICANT CHANGE UP (ref 135–145)
SP GR SPEC: 1.03 — SIGNIFICANT CHANGE UP (ref 1–1.03)
SP GR UR: 1.03 — SIGNIFICANT CHANGE UP (ref 1–1.03)
SPECIMEN SOURCE: SIGNIFICANT CHANGE UP
SQUAMOUS # UR AUTO: SIGNIFICANT CHANGE UP
UROBILINOGEN FLD QL: NORMAL E.U. — SIGNIFICANT CHANGE UP (ref 0.1–0.2)
WBC # BLD: 7.33 K/UL — SIGNIFICANT CHANGE UP (ref 3.8–10.5)
WBC # FLD AUTO: 7.33 K/UL — SIGNIFICANT CHANGE UP (ref 3.8–10.5)
WBC UR QL: SIGNIFICANT CHANGE UP (ref 0–?)

## 2017-02-20 PROCEDURE — 76830 TRANSVAGINAL US NON-OB: CPT | Mod: 26

## 2017-02-20 PROCEDURE — 99284 EMERGENCY DEPT VISIT MOD MDM: CPT

## 2017-02-20 RX ORDER — KETOROLAC TROMETHAMINE 30 MG/ML
30 SYRINGE (ML) INJECTION ONCE
Qty: 0 | Refills: 0 | Status: DISCONTINUED | OUTPATIENT
Start: 2017-02-20 | End: 2017-02-20

## 2017-02-20 RX ORDER — KETOROLAC TROMETHAMINE 30 MG/ML
15 SYRINGE (ML) INJECTION ONCE
Qty: 0 | Refills: 0 | Status: DISCONTINUED | OUTPATIENT
Start: 2017-02-20 | End: 2017-02-20

## 2017-02-20 RX ADMIN — Medication 15 MILLIGRAM(S): at 20:34

## 2017-02-20 NOTE — ED PROVIDER NOTE - PHYSICAL EXAMINATION
Abdominal tenderness in RLQ, right suprapubic, LLQ. Pelvic exam reveals mild blood present in vaginal vault, Cervical motion tendernes, and right adnexal tenderness. No discharge noted. Abdominal tenderness in RLQ, right suprapubic, LLQ. Pelvic exam reveals mild blood present in vaginal vault, Cervical motion tenderness, and right adnexal tenderness. No discharge noted.

## 2017-02-20 NOTE — ED ADULT TRIAGE NOTE - CHIEF COMPLAINT QUOTE
[facility] [place] for abdominal pain, c/o worsening abdominal pain, lower back pain, chills, sweating and lack of appetite.

## 2017-02-20 NOTE — ED PROVIDER NOTE - CARE PLAN
Principal Discharge DX:	Hemorrhagic cyst of ovary  Instructions for follow-up, activity and diet:	Follow up with PMD with 48 hrs and OB/GYN as scheduled with results from ED to be reviewed. Return to ED if any new, concerning or worsening symptoms. Take Motrin 600mg every 6 hrs as needed for the pain.

## 2017-02-20 NOTE — ED PROVIDER NOTE - ATTENDING CONTRIBUTION TO CARE
Francisco I performed a face to face evaluation of this patient and performed a history and physical examination on the patient.  I agree with the PA's history, physical examination, and plan of the patient. patient with right adnexal pain and vaginal bleeding. CMT reported on PA's exam. Initial concern for TOA. US shows likely hemorrhagic cyst, pain improved, afebrile, no leukocytosis. well appearing. dc.

## 2017-02-20 NOTE — ED PROVIDER NOTE - PROGRESS NOTE DETAILS
VINCE Jarquin: I have personally seen and examined this patient. I have reviewed and addended the HPI, ROS, PE, and A/P as necessary. I agree with the above plan of care. VINCE Aguila: Transvaginal sono revealed a right hemorrhagic cyst. Ucg was negative, toradol given for pain. Pt reassessed. Pt is stable for discharge, and discharge plan discussed with patient, followup with OB/gyn

## 2017-02-20 NOTE — ED PROVIDER NOTE - MEDICAL DECISION MAKING DETAILS
28 y/o female complaining of abdominal pain, vaginal discharge, chills. Will obtain CBC, CMP, UA, UCG, transvaginal sono, await results to be reviewed Will give toradol with negative pregancy result to treat pain. Reassess pt.

## 2017-02-20 NOTE — ED PROVIDER NOTE - PLAN OF CARE
Follow up with PMD with 48 hrs and OB/GYN as scheduled with results from ED to be reviewed. Return to ED if any new, concerning or worsening symptoms. Take Motrin 600mg every 6 hrs as needed for the pain.

## 2017-02-20 NOTE — ED PROVIDER NOTE - ABDOMINAL TENDER
right upper quadrant/left lower quadrant/right lower quadrant/suprapubic right lower quadrant/suprapubic

## 2017-02-20 NOTE — ED PROVIDER NOTE - OBJECTIVE STATEMENT
26 yo female with PMH of right ovarian cyst presents to the ED c/o 8/10 aching right abdominal pain that radiates to the right back and right  pelvic region. Pt admits to fever, chills, mild nausea, and a yellow malodorous vaginal discharge. Pt is sexually active with one 26 yo female with PMH of right ovarian cyst presents to the ED c/o 8/10 aching right abdominal pain that radiates to the right back and right pelvic region, and left lower abdomen. Pt admits to subjective fever, chills, mild nausea, and a yellow malodorous vaginal discharge. Pt is sexually active with one partner, with occasional condom use. Pt presented to the ED yesterday with similar symptoms, as well as diarrhea, the diarrhea has resolved with treatment with Imodium, however abdominal pain, fever, and chills have worsened prompting her to return to the ED. Denies Cp, sob, vomiting, urinary frequency, pyuria, and hematuria. LMP 1/14/17.

## 2017-02-21 ENCOUNTER — APPOINTMENT (OUTPATIENT)
Dept: GASTROENTEROLOGY | Facility: CLINIC | Age: 27
End: 2017-02-21

## 2017-02-22 LAB — SPECIMEN SOURCE: SIGNIFICANT CHANGE UP

## 2017-02-23 ENCOUNTER — APPOINTMENT (OUTPATIENT)
Dept: ULTRASOUND IMAGING | Facility: CLINIC | Age: 27
End: 2017-02-23

## 2017-02-23 NOTE — ED POST DISCHARGE NOTE - RESULT SUMMARY
UCX: lactobacillus species 10,000-49,000CFU/ml prelim. No antibiotic listed in ED provider note or prescription writer at time of discharge. Admin P.A. to follow results to final.

## 2017-02-27 ENCOUNTER — APPOINTMENT (OUTPATIENT)
Dept: OBGYN | Facility: CLINIC | Age: 27
End: 2017-02-27

## 2017-02-27 ENCOUNTER — APPOINTMENT (OUTPATIENT)
Dept: GASTROENTEROLOGY | Facility: CLINIC | Age: 27
End: 2017-02-27

## 2017-02-28 LAB — BACTERIA UR CULT: SIGNIFICANT CHANGE UP

## 2017-03-03 ENCOUNTER — APPOINTMENT (OUTPATIENT)
Dept: OBGYN | Facility: CLINIC | Age: 27
End: 2017-03-03

## 2017-03-05 ENCOUNTER — RESULT REVIEW (OUTPATIENT)
Age: 27
End: 2017-03-05

## 2017-03-06 ENCOUNTER — APPOINTMENT (OUTPATIENT)
Dept: HEMATOLOGY ONCOLOGY | Facility: CLINIC | Age: 27
End: 2017-03-06

## 2017-03-06 ENCOUNTER — APPOINTMENT (OUTPATIENT)
Dept: GASTROENTEROLOGY | Facility: CLINIC | Age: 27
End: 2017-03-06

## 2017-03-06 VITALS
RESPIRATION RATE: 16 BRPM | OXYGEN SATURATION: 97 % | WEIGHT: 118.83 LBS | HEART RATE: 83 BPM | SYSTOLIC BLOOD PRESSURE: 116 MMHG | BODY MASS INDEX: 20.79 KG/M2 | DIASTOLIC BLOOD PRESSURE: 83 MMHG | TEMPERATURE: 97.6 F

## 2017-03-06 DIAGNOSIS — R79.1 ABNORMAL COAGULATION PROFILE: ICD-10-CM

## 2017-03-06 LAB
BASOPHILS # BLD AUTO: 0 K/UL — SIGNIFICANT CHANGE UP (ref 0–0.2)
BASOPHILS NFR BLD AUTO: 0.6 % — SIGNIFICANT CHANGE UP (ref 0–2)
EOSINOPHIL # BLD AUTO: 0.3 K/UL — SIGNIFICANT CHANGE UP (ref 0–0.5)
EOSINOPHIL NFR BLD AUTO: 6 % — SIGNIFICANT CHANGE UP (ref 0–6)
HCT VFR BLD CALC: 41.5 % — SIGNIFICANT CHANGE UP (ref 34.5–45)
HGB BLD-MCNC: 13.9 G/DL — SIGNIFICANT CHANGE UP (ref 11.5–15.5)
LYMPHOCYTES # BLD AUTO: 1.3 K/UL — SIGNIFICANT CHANGE UP (ref 1–3.3)
LYMPHOCYTES # BLD AUTO: 26.4 % — SIGNIFICANT CHANGE UP (ref 13–44)
MCHC RBC-ENTMCNC: 29.4 PG — SIGNIFICANT CHANGE UP (ref 27–34)
MCHC RBC-ENTMCNC: 33.6 G/DL — SIGNIFICANT CHANGE UP (ref 32–36)
MCV RBC AUTO: 87.4 FL — SIGNIFICANT CHANGE UP (ref 80–100)
MONOCYTES # BLD AUTO: 0.6 K/UL — SIGNIFICANT CHANGE UP (ref 0–0.9)
MONOCYTES NFR BLD AUTO: 11.9 % — SIGNIFICANT CHANGE UP (ref 2–14)
NEUTROPHILS # BLD AUTO: 2.8 K/UL — SIGNIFICANT CHANGE UP (ref 1.8–7.4)
NEUTROPHILS NFR BLD AUTO: 55.2 % — SIGNIFICANT CHANGE UP (ref 43–77)
PLATELET # BLD AUTO: 242 K/UL — SIGNIFICANT CHANGE UP (ref 150–400)
RBC # BLD: 4.74 M/UL — SIGNIFICANT CHANGE UP (ref 3.8–5.2)
RBC # FLD: 10.9 % — SIGNIFICANT CHANGE UP (ref 10.3–14.5)
WBC # BLD: 5 K/UL — SIGNIFICANT CHANGE UP (ref 3.8–10.5)
WBC # FLD AUTO: 5 K/UL — SIGNIFICANT CHANGE UP (ref 3.8–10.5)

## 2017-03-09 ENCOUNTER — RESULT REVIEW (OUTPATIENT)
Age: 27
End: 2017-03-09

## 2017-03-10 ENCOUNTER — APPOINTMENT (OUTPATIENT)
Dept: GASTROENTEROLOGY | Facility: CLINIC | Age: 27
End: 2017-03-10

## 2017-03-13 ENCOUNTER — APPOINTMENT (OUTPATIENT)
Dept: OBGYN | Facility: CLINIC | Age: 27
End: 2017-03-13

## 2017-04-05 ENCOUNTER — APPOINTMENT (OUTPATIENT)
Dept: DERMATOLOGY | Facility: CLINIC | Age: 27
End: 2017-04-05

## 2017-04-05 VITALS
WEIGHT: 118.83 LBS | SYSTOLIC BLOOD PRESSURE: 102 MMHG | HEIGHT: 63.39 IN | BODY MASS INDEX: 20.79 KG/M2 | DIASTOLIC BLOOD PRESSURE: 56 MMHG

## 2017-04-05 DIAGNOSIS — Z91.89 OTHER SPECIFIED PERSONAL RISK FACTORS, NOT ELSEWHERE CLASSIFIED: ICD-10-CM

## 2017-04-05 DIAGNOSIS — Z78.9 OTHER SPECIFIED HEALTH STATUS: ICD-10-CM

## 2017-04-05 DIAGNOSIS — L30.9 DERMATITIS, UNSPECIFIED: ICD-10-CM

## 2017-04-05 DIAGNOSIS — D68.9 COAGULATION DEFECT, UNSPECIFIED: ICD-10-CM

## 2017-04-20 ENCOUNTER — APPOINTMENT (OUTPATIENT)
Dept: GASTROENTEROLOGY | Facility: CLINIC | Age: 27
End: 2017-04-20

## 2017-05-11 ENCOUNTER — EMERGENCY (EMERGENCY)
Facility: HOSPITAL | Age: 27
LOS: 1 days | Discharge: HOME | End: 2017-05-11
Attending: EMERGENCY MEDICINE | Admitting: EMERGENCY MEDICINE
Payer: COMMERCIAL

## 2017-05-11 ENCOUNTER — APPOINTMENT (OUTPATIENT)
Dept: OBGYN | Facility: CLINIC | Age: 27
End: 2017-05-11

## 2017-05-11 VITALS
HEART RATE: 65 BPM | SYSTOLIC BLOOD PRESSURE: 166 MMHG | RESPIRATION RATE: 16 BRPM | OXYGEN SATURATION: 100 % | DIASTOLIC BLOOD PRESSURE: 73 MMHG

## 2017-05-11 VITALS
HEART RATE: 87 BPM | TEMPERATURE: 98 F | SYSTOLIC BLOOD PRESSURE: 139 MMHG | OXYGEN SATURATION: 100 % | RESPIRATION RATE: 16 BRPM | DIASTOLIC BLOOD PRESSURE: 82 MMHG

## 2017-05-11 LAB
ALBUMIN SERPL ELPH-MCNC: 4.3 G/DL — SIGNIFICANT CHANGE UP (ref 3.3–5)
ALP SERPL-CCNC: 53 U/L — SIGNIFICANT CHANGE UP (ref 40–120)
ALT FLD-CCNC: 10 U/L — SIGNIFICANT CHANGE UP (ref 4–33)
AST SERPL-CCNC: 17 U/L — SIGNIFICANT CHANGE UP (ref 4–32)
BASE EXCESS BLDV CALC-SCNC: 3.5 MMOL/L — SIGNIFICANT CHANGE UP
BASOPHILS # BLD AUTO: 0.01 K/UL — SIGNIFICANT CHANGE UP (ref 0–0.2)
BASOPHILS NFR BLD AUTO: 0.2 % — SIGNIFICANT CHANGE UP (ref 0–2)
BILIRUB SERPL-MCNC: 0.2 MG/DL — SIGNIFICANT CHANGE UP (ref 0.2–1.2)
BLOOD GAS VENOUS - CREATININE: 0.74 MG/DL — SIGNIFICANT CHANGE UP (ref 0.5–1.3)
BUN SERPL-MCNC: 10 MG/DL — SIGNIFICANT CHANGE UP (ref 7–23)
CALCIUM SERPL-MCNC: 9.3 MG/DL — SIGNIFICANT CHANGE UP (ref 8.4–10.5)
CHLORIDE BLDV-SCNC: 109 MMOL/L — HIGH (ref 96–108)
CHLORIDE SERPL-SCNC: 104 MMOL/L — SIGNIFICANT CHANGE UP (ref 98–107)
CO2 SERPL-SCNC: 24 MMOL/L — SIGNIFICANT CHANGE UP (ref 22–31)
CREAT SERPL-MCNC: 0.75 MG/DL — SIGNIFICANT CHANGE UP (ref 0.5–1.3)
EOSINOPHIL # BLD AUTO: 0.27 K/UL — SIGNIFICANT CHANGE UP (ref 0–0.5)
EOSINOPHIL NFR BLD AUTO: 5.9 % — SIGNIFICANT CHANGE UP (ref 0–6)
GAS PNL BLDV: 134 MMOL/L — LOW (ref 136–146)
GLUCOSE BLDV-MCNC: 92 — SIGNIFICANT CHANGE UP (ref 70–99)
GLUCOSE SERPL-MCNC: 90 MG/DL — SIGNIFICANT CHANGE UP (ref 70–99)
HCO3 BLDV-SCNC: 26 MMOL/L — SIGNIFICANT CHANGE UP (ref 20–27)
HCT VFR BLD CALC: 40.2 % — SIGNIFICANT CHANGE UP (ref 34.5–45)
HCT VFR BLDV CALC: 42.2 % — SIGNIFICANT CHANGE UP (ref 34.5–45)
HGB BLD-MCNC: 13.2 G/DL — SIGNIFICANT CHANGE UP (ref 11.5–15.5)
HGB BLDV-MCNC: 13.7 G/DL — SIGNIFICANT CHANGE UP (ref 11.5–15.5)
IMM GRANULOCYTES NFR BLD AUTO: 0 % — SIGNIFICANT CHANGE UP (ref 0–1.5)
LACTATE BLDV-MCNC: 1.2 MMOL/L — SIGNIFICANT CHANGE UP (ref 0.5–2)
LYMPHOCYTES # BLD AUTO: 1.41 K/UL — SIGNIFICANT CHANGE UP (ref 1–3.3)
LYMPHOCYTES # BLD AUTO: 30.9 % — SIGNIFICANT CHANGE UP (ref 13–44)
MCHC RBC-ENTMCNC: 28.7 PG — SIGNIFICANT CHANGE UP (ref 27–34)
MCHC RBC-ENTMCNC: 32.8 % — SIGNIFICANT CHANGE UP (ref 32–36)
MCV RBC AUTO: 87.4 FL — SIGNIFICANT CHANGE UP (ref 80–100)
MONOCYTES # BLD AUTO: 0.3 K/UL — SIGNIFICANT CHANGE UP (ref 0–0.9)
MONOCYTES NFR BLD AUTO: 6.6 % — SIGNIFICANT CHANGE UP (ref 2–14)
NEUTROPHILS # BLD AUTO: 2.58 K/UL — SIGNIFICANT CHANGE UP (ref 1.8–7.4)
NEUTROPHILS NFR BLD AUTO: 56.4 % — SIGNIFICANT CHANGE UP (ref 43–77)
PCO2 BLDV: 47 MMHG — SIGNIFICANT CHANGE UP (ref 41–51)
PH BLDV: 7.4 PH — SIGNIFICANT CHANGE UP (ref 7.32–7.43)
PLATELET # BLD AUTO: 261 K/UL — SIGNIFICANT CHANGE UP (ref 150–400)
PMV BLD: 10.2 FL — SIGNIFICANT CHANGE UP (ref 7–13)
PO2 BLDV: 28 MMHG — LOW (ref 35–40)
POTASSIUM BLDV-SCNC: 3.6 MMOL/L — SIGNIFICANT CHANGE UP (ref 3.4–4.5)
POTASSIUM SERPL-MCNC: 4 MMOL/L — SIGNIFICANT CHANGE UP (ref 3.5–5.3)
POTASSIUM SERPL-SCNC: 4 MMOL/L — SIGNIFICANT CHANGE UP (ref 3.5–5.3)
PROT SERPL-MCNC: 7.3 G/DL — SIGNIFICANT CHANGE UP (ref 6–8.3)
RBC # BLD: 4.6 M/UL — SIGNIFICANT CHANGE UP (ref 3.8–5.2)
RBC # FLD: 12.3 % — SIGNIFICANT CHANGE UP (ref 10.3–14.5)
SAO2 % BLDV: 46.3 % — LOW (ref 60–85)
SODIUM SERPL-SCNC: 140 MMOL/L — SIGNIFICANT CHANGE UP (ref 135–145)
TSH SERPL-MCNC: 1.53 UIU/ML — SIGNIFICANT CHANGE UP (ref 0.27–4.2)
WBC # BLD: 4.57 K/UL — SIGNIFICANT CHANGE UP (ref 3.8–10.5)
WBC # FLD AUTO: 4.57 K/UL — SIGNIFICANT CHANGE UP (ref 3.8–10.5)

## 2017-05-11 PROCEDURE — 99284 EMERGENCY DEPT VISIT MOD MDM: CPT

## 2017-05-11 RX ORDER — SODIUM CHLORIDE 9 MG/ML
1000 INJECTION INTRAMUSCULAR; INTRAVENOUS; SUBCUTANEOUS ONCE
Qty: 0 | Refills: 0 | Status: COMPLETED | OUTPATIENT
Start: 2017-05-11 | End: 2017-05-11

## 2017-05-11 RX ADMIN — SODIUM CHLORIDE 1000 MILLILITER(S): 9 INJECTION INTRAMUSCULAR; INTRAVENOUS; SUBCUTANEOUS at 17:20

## 2017-05-11 NOTE — ED PROVIDER NOTE - PROGRESS NOTE DETAILS
Jason LEGGETT- pt discharged home after counselling about trying gluten free diet, recommend second opinion from another GI to control long standing diarrhea

## 2017-05-11 NOTE — ED ADULT TRIAGE NOTE - CHIEF COMPLAINT QUOTE
pt amb to triage c/o rash and lump on shoulder blades x 5 days, also c/o diarrhea x 4 months, transient nausea, 10 lbs weight loss since onset, baseline PO intake, denies allergies to medications, LMP current

## 2017-05-11 NOTE — ED PROVIDER NOTE - OBJECTIVE STATEMENT
28 y/o F with focal colitis with chronic diarrhea since feb and treated by GI but had weight loss of 15 lbs and now has diffuse macular rash off& on, no nausea, vomiting, abd pain, blood or mucous in stool. Pt also felt a bump on her rt upper back and came to the ED for evaluation

## 2017-05-11 NOTE — ED ADULT NURSE NOTE - OBJECTIVE STATEMENT
Pt recd A+Ox3. Hx of colitis with diarrhea since Feb. Being treated outpatient GI but now c/o rash on body and weight loss. Labs drawn and sent. Fluids infusing as ordered. Will continue to monitor.

## 2017-05-17 ENCOUNTER — APPOINTMENT (OUTPATIENT)
Dept: OBGYN | Facility: CLINIC | Age: 27
End: 2017-05-17

## 2017-05-23 ENCOUNTER — OUTPATIENT (OUTPATIENT)
Dept: OUTPATIENT SERVICES | Facility: HOSPITAL | Age: 27
LOS: 1 days | Discharge: HOME | End: 2017-05-23

## 2017-05-23 DIAGNOSIS — R79.1 ABNORMAL COAGULATION PROFILE: ICD-10-CM

## 2017-05-24 ENCOUNTER — RESULT REVIEW (OUTPATIENT)
Age: 27
End: 2017-05-24

## 2017-05-24 ENCOUNTER — APPOINTMENT (OUTPATIENT)
Dept: HEMATOLOGY ONCOLOGY | Facility: CLINIC | Age: 27
End: 2017-05-24

## 2017-05-24 VITALS
SYSTOLIC BLOOD PRESSURE: 118 MMHG | HEART RATE: 82 BPM | DIASTOLIC BLOOD PRESSURE: 70 MMHG | WEIGHT: 112.44 LBS | BODY MASS INDEX: 19.67 KG/M2 | RESPIRATION RATE: 16 BRPM | TEMPERATURE: 98 F

## 2017-05-24 DIAGNOSIS — R59.0 LOCALIZED ENLARGED LYMPH NODES: ICD-10-CM

## 2017-05-24 LAB
BASOPHILS # BLD AUTO: 0 K/UL — SIGNIFICANT CHANGE UP (ref 0–0.2)
BASOPHILS NFR BLD AUTO: 0.6 % — SIGNIFICANT CHANGE UP (ref 0–2)
EOSINOPHIL # BLD AUTO: 0.4 K/UL — SIGNIFICANT CHANGE UP (ref 0–0.5)
EOSINOPHIL NFR BLD AUTO: 8.2 % — HIGH (ref 0–6)
HCT VFR BLD CALC: 41.1 % — SIGNIFICANT CHANGE UP (ref 34.5–45)
HGB BLD-MCNC: 12 G/DL — SIGNIFICANT CHANGE UP (ref 11.5–15.5)
LYMPHOCYTES # BLD AUTO: 1.8 K/UL — SIGNIFICANT CHANGE UP (ref 1–3.3)
LYMPHOCYTES # BLD AUTO: 37.3 % — SIGNIFICANT CHANGE UP (ref 13–44)
MCHC RBC-ENTMCNC: 25.5 PG — LOW (ref 27–34)
MCHC RBC-ENTMCNC: 29.2 G/DL — LOW (ref 32–36)
MCV RBC AUTO: 87.4 FL — SIGNIFICANT CHANGE UP (ref 80–100)
MONOCYTES # BLD AUTO: 0.4 K/UL — SIGNIFICANT CHANGE UP (ref 0–0.9)
MONOCYTES NFR BLD AUTO: 9.1 % — SIGNIFICANT CHANGE UP (ref 2–14)
NEUTROPHILS # BLD AUTO: 2.2 K/UL — SIGNIFICANT CHANGE UP (ref 1.8–7.4)
NEUTROPHILS NFR BLD AUTO: 44.7 % — SIGNIFICANT CHANGE UP (ref 43–77)
PLATELET # BLD AUTO: 263 K/UL — SIGNIFICANT CHANGE UP (ref 150–400)
RBC # BLD: 4.71 M/UL — SIGNIFICANT CHANGE UP (ref 3.8–5.2)
RBC # FLD: 11 % — SIGNIFICANT CHANGE UP (ref 10.3–14.5)
WBC # BLD: 4.9 K/UL — SIGNIFICANT CHANGE UP (ref 3.8–10.5)
WBC # FLD AUTO: 4.9 K/UL — SIGNIFICANT CHANGE UP (ref 3.8–10.5)

## 2017-05-24 RX ORDER — CLINDAMYCIN PHOSPHATE 10 MG/ML
1 LOTION TOPICAL
Qty: 1 | Refills: 3 | Status: DISCONTINUED | COMMUNITY
Start: 2017-04-05 | End: 2017-05-24

## 2017-05-24 RX ORDER — TRETINOIN 0.25 MG/G
0.03 CREAM TOPICAL
Qty: 1 | Refills: 3 | Status: DISCONTINUED | COMMUNITY
Start: 2017-04-05 | End: 2017-05-24

## 2017-05-24 RX ORDER — TRANEXAMIC ACID 650 MG/1
650 TABLET ORAL EVERY 8 HOURS
Qty: 60 | Refills: 1 | Status: DISCONTINUED | COMMUNITY
Start: 2017-02-10 | End: 2017-05-24

## 2017-05-30 ENCOUNTER — APPOINTMENT (OUTPATIENT)
Dept: GASTROENTEROLOGY | Facility: CLINIC | Age: 27
End: 2017-05-30

## 2017-06-18 ENCOUNTER — FORM ENCOUNTER (OUTPATIENT)
Age: 27
End: 2017-06-18

## 2017-06-19 ENCOUNTER — APPOINTMENT (OUTPATIENT)
Dept: CT IMAGING | Facility: IMAGING CENTER | Age: 27
End: 2017-06-19

## 2017-06-19 ENCOUNTER — OUTPATIENT (OUTPATIENT)
Dept: OUTPATIENT SERVICES | Facility: HOSPITAL | Age: 27
LOS: 1 days | End: 2017-06-19
Payer: COMMERCIAL

## 2017-06-19 DIAGNOSIS — Z00.8 ENCOUNTER FOR OTHER GENERAL EXAMINATION: ICD-10-CM

## 2017-06-19 PROCEDURE — 71260 CT THORAX DX C+: CPT

## 2017-06-19 PROCEDURE — 70491 CT SOFT TISSUE NECK W/DYE: CPT

## 2017-06-19 PROCEDURE — 74177 CT ABD & PELVIS W/CONTRAST: CPT

## 2017-06-20 ENCOUNTER — OUTPATIENT (OUTPATIENT)
Dept: OUTPATIENT SERVICES | Facility: HOSPITAL | Age: 27
LOS: 1 days | Discharge: HOME | End: 2017-06-20

## 2017-06-20 DIAGNOSIS — R79.1 ABNORMAL COAGULATION PROFILE: ICD-10-CM

## 2017-06-26 ENCOUNTER — RESULT REVIEW (OUTPATIENT)
Age: 27
End: 2017-06-26

## 2017-06-26 ENCOUNTER — APPOINTMENT (OUTPATIENT)
Dept: HEMATOLOGY ONCOLOGY | Facility: CLINIC | Age: 27
End: 2017-06-26

## 2017-06-26 VITALS
DIASTOLIC BLOOD PRESSURE: 82 MMHG | SYSTOLIC BLOOD PRESSURE: 112 MMHG | HEIGHT: 63.39 IN | RESPIRATION RATE: 16 BRPM | BODY MASS INDEX: 19.48 KG/M2 | TEMPERATURE: 97.8 F | WEIGHT: 111.33 LBS | HEART RATE: 80 BPM | OXYGEN SATURATION: 99 %

## 2017-06-26 DIAGNOSIS — R59.0 LOCALIZED ENLARGED LYMPH NODES: ICD-10-CM

## 2017-06-26 LAB
BASOPHILS # BLD AUTO: 0 K/UL — SIGNIFICANT CHANGE UP (ref 0–0.2)
BASOPHILS NFR BLD AUTO: 0.2 % — SIGNIFICANT CHANGE UP (ref 0–2)
EOSINOPHIL # BLD AUTO: 0.2 K/UL — SIGNIFICANT CHANGE UP (ref 0–0.5)
EOSINOPHIL NFR BLD AUTO: 2.8 % — SIGNIFICANT CHANGE UP (ref 0–6)
HCT VFR BLD CALC: 44 % — SIGNIFICANT CHANGE UP (ref 34.5–45)
HGB BLD-MCNC: 14.9 G/DL — SIGNIFICANT CHANGE UP (ref 11.5–15.5)
LYMPHOCYTES # BLD AUTO: 1.7 K/UL — SIGNIFICANT CHANGE UP (ref 1–3.3)
LYMPHOCYTES # BLD AUTO: 27.8 % — SIGNIFICANT CHANGE UP (ref 13–44)
MCHC RBC-ENTMCNC: 29.5 PG — SIGNIFICANT CHANGE UP (ref 27–34)
MCHC RBC-ENTMCNC: 33.9 G/DL — SIGNIFICANT CHANGE UP (ref 32–36)
MCV RBC AUTO: 87.1 FL — SIGNIFICANT CHANGE UP (ref 80–100)
MONOCYTES # BLD AUTO: 0.4 K/UL — SIGNIFICANT CHANGE UP (ref 0–0.9)
MONOCYTES NFR BLD AUTO: 6.6 % — SIGNIFICANT CHANGE UP (ref 2–14)
NEUTROPHILS # BLD AUTO: 3.8 K/UL — SIGNIFICANT CHANGE UP (ref 1.8–7.4)
NEUTROPHILS NFR BLD AUTO: 62.7 % — SIGNIFICANT CHANGE UP (ref 43–77)
PLATELET # BLD AUTO: 283 K/UL — SIGNIFICANT CHANGE UP (ref 150–400)
RBC # BLD: 5.05 M/UL — SIGNIFICANT CHANGE UP (ref 3.8–5.2)
RBC # FLD: 11.2 % — SIGNIFICANT CHANGE UP (ref 10.3–14.5)
WBC # BLD: 6 K/UL — SIGNIFICANT CHANGE UP (ref 3.8–10.5)
WBC # FLD AUTO: 6 K/UL — SIGNIFICANT CHANGE UP (ref 3.8–10.5)

## 2017-07-11 ENCOUNTER — APPOINTMENT (OUTPATIENT)
Dept: GASTROENTEROLOGY | Facility: CLINIC | Age: 27
End: 2017-07-11

## 2017-07-12 ENCOUNTER — APPOINTMENT (OUTPATIENT)
Dept: DERMATOLOGY | Facility: CLINIC | Age: 27
End: 2017-07-12

## 2017-09-07 ENCOUNTER — APPOINTMENT (OUTPATIENT)
Dept: OBGYN | Facility: CLINIC | Age: 27
End: 2017-09-07
Payer: COMMERCIAL

## 2017-09-07 PROCEDURE — 90471 IMMUNIZATION ADMIN: CPT

## 2017-09-07 PROCEDURE — 96372 THER/PROPH/DIAG INJ SC/IM: CPT

## 2017-09-07 PROCEDURE — 90651 9VHPV VACCINE 2/3 DOSE IM: CPT

## 2017-09-16 ENCOUNTER — EMERGENCY (EMERGENCY)
Facility: HOSPITAL | Age: 27
LOS: 1 days | Discharge: HOME | End: 2017-09-16
Admitting: EMERGENCY MEDICINE
Payer: COMMERCIAL

## 2017-09-16 VITALS
SYSTOLIC BLOOD PRESSURE: 136 MMHG | HEART RATE: 104 BPM | TEMPERATURE: 98 F | DIASTOLIC BLOOD PRESSURE: 96 MMHG | OXYGEN SATURATION: 100 % | RESPIRATION RATE: 16 BRPM

## 2017-09-16 DIAGNOSIS — F43.22 ADJUSTMENT DISORDER WITH ANXIETY: ICD-10-CM

## 2017-09-16 DIAGNOSIS — R69 ILLNESS, UNSPECIFIED: ICD-10-CM

## 2017-09-16 DIAGNOSIS — F51.02 ADJUSTMENT INSOMNIA: ICD-10-CM

## 2017-09-16 PROCEDURE — 99284 EMERGENCY DEPT VISIT MOD MDM: CPT

## 2017-09-16 PROCEDURE — 90792 PSYCH DIAG EVAL W/MED SRVCS: CPT

## 2017-09-16 NOTE — ED BEHAVIORAL HEALTH ASSESSMENT NOTE - SAFETY PLAN DETAILS
Advised to call 911 or immediately return to ER if should feel unsafe in anyway or if should experience suicidal thoughts.

## 2017-09-16 NOTE — ED BEHAVIORAL HEALTH ASSESSMENT NOTE - SUICIDE PROTECTIVE FACTORS
Future oriented/Engaged in work or school/Responsibility to family and others/Supportive social network or family/Identifies reasons for living

## 2017-09-16 NOTE — ED BEHAVIORAL HEALTH ASSESSMENT NOTE - RISK ASSESSMENT
Risk factors include anxiety symptoms and insomnia, precipitating factors seems to be recent medical condition diagnoses and medication changes by PCP, protective factors include supportive primary and social support, engagement in work and future oriented thinking.  Patient with no history of suicidality, no history of psychiatric hospitalizations, represents as low risk at this time.

## 2017-09-16 NOTE — ED ADULT TRIAGE NOTE - CHIEF COMPLAINT QUOTE
pt sts hasn't been able to sleep since sept 1. pt sts been taking different medications with no relief and wants to speak to psych MD

## 2017-09-16 NOTE — ED PROVIDER NOTE - CARE PLAN
Principal Discharge DX:	Adjustment disorder with anxious mood  Secondary Diagnosis:	Adjustment insomnia

## 2017-09-16 NOTE — ED BEHAVIORAL HEALTH ASSESSMENT NOTE - DESCRIPTION
Vital Signs Triage Note to FS:   ,,ED ADULT Flow Sheet:    16-Sep-2017 18:35  · Temp (F)	98.4  · Temp (C) Temp (C)	36.9  · Heart Rate Heart Rate (beats/min)	104  · BP Systolic Systolic	136  · BP Diastolic Diastolic (mm Hg)	96  · Respiration Rate (breaths/min) Respiration Rate (breaths/min)	16  · SpO2 (%) SpO2 (%)	100 calm, cooperative, pleasant, no psychomotor abnormalities, no prns required, in good behavioral control  Vital Signs Triage Note to FS:   ,,ED ADULT Flow Sheet:    16-Sep-2017 18:35  · Temp (F)	98.4  · Temp (C) Temp (C)	36.9  · Heart Rate Heart Rate (beats/min)	104  · BP Systolic Systolic	136  · BP Diastolic Diastolic (mm Hg)	96  · Respiration Rate (breaths/min) Respiration Rate (breaths/min)	16  · SpO2 (%) SpO2 (%)	100 GERD, Small Bowel Overgrowth with associated loose bowels Lives in an apartment with parents in [place], [place]. Works full-time as a .  Never , has no children, supportive boyfriend.

## 2017-09-16 NOTE — ED BEHAVIORAL HEALTH ASSESSMENT NOTE - REFERRAL / APPOINTMENT DETAILS
Patient advised to contact PCP and insurance company for psychiatric referrals in network, also provided brochure for [facility] available for walk ins, Monday-Friday 9am-7pm.  Patient expresses understanding and agrees to plan for follow up as recommended.

## 2017-09-16 NOTE — ED BEHAVIORAL HEALTH ASSESSMENT NOTE - OTHER PAST PSYCHIATRIC HISTORY (INCLUDE DETAILS REGARDING ONSET, COURSE OF ILLNESS, INPATIENT/OUTPATIENT TREATMENT)
No prior outpatient psychiatric treatment, has been treated for insomnia/anxiety by PCP for past 2 weeks.  No history of inpatient psychiatric hospitalizations, no history of suicidality

## 2017-09-16 NOTE — ED PROVIDER NOTE - OBJECTIVE STATEMENT
26 y/o F hx Depression, GERD presents to ER w c/o Insomnia x 2 weeks. Admits that she was recent prescribed ambien and zoloft which seems not to be working.  Denies falling, punching or kicking any  objects.  Denies pain, SOB, fever, chills, chest/abdominal discomfort. Denies SI/HI/AH/VH.    LNMP - 7/2017

## 2017-09-16 NOTE — ED PROVIDER NOTE - MEDICAL DECISION MAKING DETAILS
28 y/o F hx Depression, GERD  Medical evaluation performed. There is no clinical evidence of intoxication or any acute medical problem requiring immediate intervention. Patient is awaiting psychiatric consultation. Final disposition will be determined by psychiatrist.

## 2017-09-16 NOTE — ED BEHAVIORAL HEALTH ASSESSMENT NOTE - HPI (INCLUDE ILLNESS QUALITY, SEVERITY, DURATION, TIMING, CONTEXT, MODIFYING FACTORS, ASSOCIATED SIGNS AND SYMPTOMS)
This is a 27 year old single Female of  descent with recent symptoms of insomnia reported since 9/1/17 treated by PCP, no past psychiatric treatment, no history of psychiatric hospitalizations, no history of suicide attempts, no history of self-injurious behavior, PMH significant for GERD, small bowel overgrowth and GI symptoms dating back the last 6 months is brought to [facility] ER by self due to complaints of insomnia and anxiety symptoms associated.  Patient reports This is a 27 year old single Female of  descent with recent symptoms of insomnia reported since 9/1/17 treated by PCP, no past psychiatric treatment, no history of psychiatric hospitalizations, no history of suicide attempts, no history of self-injurious behavior, PMH significant for GERD, small bowel overgrowth and GI symptoms dating back the last 6 months is brought to [facility] ER by self due to complaints of insomnia and anxiety symptoms associated.  Patient reports poor sleep since September 1 and express anxiety related to such.  Patient reports no prior history of anxiety or insomnia and states concerns for medical well-being as was diagnosed with GERD and small bowel overgrowth with treatment on Xifaxin for two courses of treatment in April and June, 2017, also was started on Xantac for GERD in August 24, 2017 but stopped on September 11, 2017 due to concerns that it may be having an impact on sleep.  Patient reports that she has tried multiple over the counter sleep agents such as Zzzquil without benefit. This is a 27 year old single Female of  descent with recent symptoms of insomnia reported since 9/1/17 treated by PCP, no past psychiatric treatment, no history of psychiatric hospitalizations, no history of suicide attempts, no history of self-injurious behavior, PMH significant for GERD, small bowel overgrowth and GI symptoms dating back the last 6 months is brought to [facility] ER by self due to complaints of insomnia and anxiety symptoms associated.  Patient reports poor sleep since September 1 and express anxiety related to such.  Patient reports no prior history of anxiety or insomnia and states concerns for medical well-being as was diagnosed with GERD and small bowel overgrowth with treatment on Xifaxin for two courses of treatment in April and June, 2017, also was started on Xantac for GERD in August 24, 2017 but stopped on September 11, 2017 due to concerns that it may be having an impact on sleep.  Patient reports that she has tried multiple over the counter sleep agents such as Zzzquil without benefit.  Patient has been prescribed Ambien 10mg by PCP and just started on Zoloft 25mg today by PCP as well, comes to the ER due to perseverative worry that insomnia symptoms will not be resolved, seeking outpatient psychiatric referral.  Patient is focused on anxiety related to insomnia symptoms and is noted to ruminate on this worry but there is no acute evidence of panic attacks at this time.  Patient denies depressed mood, expresses positive feelings of her life related to family support, relationship with boyfriend and occupational engagement.  Patient has been able to perform ADLs and function both personally and occupationally with no evidence of anhedonia, no feelings of hopelessness or helplessness.  Patient denies SI, no passive SI, no intent, no plan, no intent, no gestures, no HI, no violent thoughts, no manic/hypomanic symptoms, no lability, no agitation, no AH, no VH, no paranoia, no suspiciousness, no evidence of PTSD/OCD/eating disorder symptoms.  Patient does not present as an acute risk to self or others at this time.  No acute signs or symptoms of dangerousness as evident or exhibited at this time.

## 2017-09-16 NOTE — ED BEHAVIORAL HEALTH ASSESSMENT NOTE - SUMMARY
This is a 27 year old single Female of  descent with recent symptoms of insomnia reported since 9/1/17 treated by PCP, no past psychiatric treatment, no history of psychiatric hospitalizations, no history of suicide attempts, no history of self-injurious behavior, PMH significant for GERD, small bowel overgrowth and GI symptoms dating back the last 6 months is brought to [facility] ER by self due to complaints of insomnia and anxiety symptoms associated.  Patient reports poor sleep since September 1 and express anxiety related to such.  Patient reports no prior history of anxiety or insomnia and states concerns for medical well-being as was diagnosed with GERD and small bowel overgrowth with treatment on Xifaxin for two courses of treatment in April and June, 2017, also was started on Xantac for GERD in August 24, 2017 but stopped on September 11, 2017 due to concerns that it may be having an impact on sleep.  Patient reports that she has tried multiple over the counter sleep agents such as Zzzquil without benefit.  Patient has been prescribed Ambien 10mg by PCP and just started on Zoloft 25mg today by PCP as well, comes to the ER due to perseverative worry that insomnia symptoms will not be resolved, seeking outpatient psychiatric referral.  Patient is focused on anxiety related to insomnia symptoms and is noted to ruminate on this worry but there is no acute evidence of panic attacks at this time.  Patient denies depressed mood, expresses positive feelings of her life related to family support, relationship with boyfriend and occupational engagement.  Patient has been able to perform ADLs and function both personally and occupationally with no evidence of anhedonia, no feelings of hopelessness or helplessness.  Patient denies SI, no passive SI, no intent, no plan, no intent, no gestures, no HI, no violent thoughts, no manic/hypomanic symptoms, no lability, no agitation, no AH, no VH, no paranoia, no suspiciousness, no evidence of PTSD/OCD/eating disorder symptoms.  Patient does not present as an acute risk to self or others at this time.  No acute signs or symptoms of dangerousness as evident or exhibited at this time. This is a 27 year old single Female of  descent with recent symptoms of insomnia reported since 9/1/17 treated by PCP, no past psychiatric treatment, no history of psychiatric hospitalizations, no history of suicide attempts, no history of self-injurious behavior, PMH significant for GERD, small bowel overgrowth and GI symptoms dating back the last 6 months is brought to [facility] ER by self due to complaints of insomnia and anxiety symptoms associated.  Patient reports poor sleep since September 1 and expresses anxiety related to such.  Patient reports no prior history of anxiety or insomnia and states concerns for medical well-being as was diagnosed with GERD and small bowel overgrowth.  Patient reports that she has tried multiple over the counter sleep agents such as Zzzquil without benefit.  Patient has been prescribed Ambien 10mg by PCP and just started on Zoloft 25mg today by PCP as well, comes to the ER due to perseverative worry that insomnia symptoms will not be resolved, seeking outpatient psychiatric referral.  Patient is focused on anxiety related to insomnia symptoms and is noted to ruminate on this worry but there is no acute evidence of panic attacks at this time.  Patient denies depressed mood, expresses positive feelings of her life related to family support, relationship with boyfriend and occupational engagement.  Patient has been able to perform ADLs and function both personally and occupationally with no evidence of anhedonia, no feelings of hopelessness or helplessness.  Patient denies SI, no passive SI, no intent, no plan, no intent, no gestures, no HI, no violent thoughts, no manic/hypomanic symptoms, no lability, no agitation, no AH, no VH, no paranoia, no suspiciousness, no evidence of PTSD/OCD/eating disorder symptoms.  Patient does not present as an acute risk to self or others at this time.  No acute signs or symptoms of dangerousness as evident or exhibited at this time.  Patient declines voluntary psychiatric hospitalization and does not meet criteria for involuntary psychiatric hospitalization.  Patient is agreeable to and motivated for outpatient psychiatric treatment at this time.

## 2017-09-16 NOTE — ED BEHAVIORAL HEALTH ASSESSMENT NOTE - DETAILS
self referred c/o blurry vision/ headache medically cleared as per EM team, care is coordinated with medical NP Roderick

## 2017-10-04 ENCOUNTER — TRANSCRIPTION ENCOUNTER (OUTPATIENT)
Age: 27
End: 2017-10-04

## 2017-10-04 ENCOUNTER — APPOINTMENT (OUTPATIENT)
Dept: GASTROENTEROLOGY | Facility: CLINIC | Age: 27
End: 2017-10-04
Payer: COMMERCIAL

## 2017-10-04 PROCEDURE — 99214 OFFICE O/P EST MOD 30 MIN: CPT

## 2017-10-04 PROCEDURE — 99204 OFFICE O/P NEW MOD 45 MIN: CPT

## 2017-10-05 ENCOUNTER — APPOINTMENT (OUTPATIENT)
Dept: PULMONOLOGY | Facility: CLINIC | Age: 27
End: 2017-10-05
Payer: COMMERCIAL

## 2017-10-05 VITALS
HEIGHT: 63.39 IN | RESPIRATION RATE: 18 BRPM | SYSTOLIC BLOOD PRESSURE: 106 MMHG | BODY MASS INDEX: 20.65 KG/M2 | HEART RATE: 104 BPM | DIASTOLIC BLOOD PRESSURE: 70 MMHG | OXYGEN SATURATION: 100 % | WEIGHT: 118 LBS

## 2017-10-05 DIAGNOSIS — G47.00 INSOMNIA, UNSPECIFIED: ICD-10-CM

## 2017-10-05 DIAGNOSIS — J45.909 UNSPECIFIED ASTHMA, UNCOMPLICATED: ICD-10-CM

## 2017-10-05 PROCEDURE — 94729 DIFFUSING CAPACITY: CPT

## 2017-10-05 PROCEDURE — 94060 EVALUATION OF WHEEZING: CPT

## 2017-10-05 PROCEDURE — 99204 OFFICE O/P NEW MOD 45 MIN: CPT | Mod: 25

## 2017-10-05 PROCEDURE — 94727 GAS DIL/WSHOT DETER LNG VOL: CPT

## 2017-11-08 ENCOUNTER — APPOINTMENT (OUTPATIENT)
Dept: NEUROLOGY | Facility: CLINIC | Age: 27
End: 2017-11-08

## 2017-11-15 ENCOUNTER — APPOINTMENT (OUTPATIENT)
Dept: PULMONOLOGY | Facility: CLINIC | Age: 27
End: 2017-11-15
Payer: COMMERCIAL

## 2017-11-15 VITALS
HEART RATE: 126 BPM | RESPIRATION RATE: 15 BRPM | WEIGHT: 116 LBS | SYSTOLIC BLOOD PRESSURE: 120 MMHG | HEIGHT: 63 IN | DIASTOLIC BLOOD PRESSURE: 60 MMHG | TEMPERATURE: 98.7 F | OXYGEN SATURATION: 97 % | BODY MASS INDEX: 20.55 KG/M2

## 2017-11-15 DIAGNOSIS — F51.04 PSYCHOPHYSIOLOGIC INSOMNIA: ICD-10-CM

## 2017-11-15 PROCEDURE — 99205 OFFICE O/P NEW HI 60 MIN: CPT | Mod: GC

## 2017-11-15 PROCEDURE — 99215 OFFICE O/P EST HI 40 MIN: CPT

## 2017-11-15 RX ORDER — TRAZODONE HYDROCHLORIDE 50 MG/1
50 TABLET ORAL
Refills: 0 | Status: DISCONTINUED | COMMUNITY
End: 2017-11-15

## 2017-11-15 RX ORDER — MONTELUKAST 10 MG/1
10 TABLET, FILM COATED ORAL
Qty: 30 | Refills: 1 | Status: DISCONTINUED | COMMUNITY
Start: 2017-10-05 | End: 2017-11-15

## 2017-11-15 RX ORDER — LOPERAMIDE HYDROCHLORIDE 2 MG/1
2 CAPSULE ORAL
Qty: 15 | Refills: 0 | Status: DISCONTINUED | COMMUNITY
Start: 2017-02-19 | End: 2017-11-15

## 2017-11-20 ENCOUNTER — MEDICATION RENEWAL (OUTPATIENT)
Age: 27
End: 2017-11-20

## 2017-11-21 ENCOUNTER — APPOINTMENT (OUTPATIENT)
Dept: OBGYN | Facility: CLINIC | Age: 27
End: 2017-11-21

## 2017-11-27 ENCOUNTER — TRANSCRIPTION ENCOUNTER (OUTPATIENT)
Age: 27
End: 2017-11-27

## 2017-12-05 ENCOUNTER — APPOINTMENT (OUTPATIENT)
Dept: PULMONOLOGY | Facility: CLINIC | Age: 27
End: 2017-12-05
Payer: SELF-PAY

## 2017-12-05 PROCEDURE — 90791 PSYCH DIAGNOSTIC EVALUATION: CPT

## 2017-12-20 ENCOUNTER — APPOINTMENT (OUTPATIENT)
Dept: PULMONOLOGY | Facility: CLINIC | Age: 27
End: 2017-12-20
Payer: SELF-PAY

## 2017-12-20 PROCEDURE — 90837 PSYTX W PT 60 MINUTES: CPT

## 2017-12-27 ENCOUNTER — APPOINTMENT (OUTPATIENT)
Dept: PULMONOLOGY | Facility: CLINIC | Age: 27
End: 2017-12-27
Payer: COMMERCIAL

## 2017-12-27 PROCEDURE — 90837 PSYTX W PT 60 MINUTES: CPT

## 2018-01-03 ENCOUNTER — APPOINTMENT (OUTPATIENT)
Dept: PULMONOLOGY | Facility: CLINIC | Age: 28
End: 2018-01-03
Payer: COMMERCIAL

## 2018-01-03 PROCEDURE — 90837 PSYTX W PT 60 MINUTES: CPT

## 2018-01-24 ENCOUNTER — APPOINTMENT (OUTPATIENT)
Dept: PULMONOLOGY | Facility: CLINIC | Age: 28
End: 2018-01-24

## 2018-01-24 ENCOUNTER — APPOINTMENT (OUTPATIENT)
Dept: PULMONOLOGY | Facility: CLINIC | Age: 28
End: 2018-01-24
Payer: SELF-PAY

## 2018-01-24 PROCEDURE — 90837 PSYTX W PT 60 MINUTES: CPT

## 2018-01-31 ENCOUNTER — APPOINTMENT (OUTPATIENT)
Dept: PULMONOLOGY | Facility: CLINIC | Age: 28
End: 2018-01-31
Payer: SELF-PAY

## 2018-01-31 PROCEDURE — 90837 PSYTX W PT 60 MINUTES: CPT

## 2018-02-07 ENCOUNTER — APPOINTMENT (OUTPATIENT)
Dept: PULMONOLOGY | Facility: CLINIC | Age: 28
End: 2018-02-07
Payer: SELF-PAY

## 2018-02-07 PROCEDURE — 90837 PSYTX W PT 60 MINUTES: CPT

## 2018-02-13 ENCOUNTER — APPOINTMENT (OUTPATIENT)
Dept: PULMONOLOGY | Facility: CLINIC | Age: 28
End: 2018-02-13
Payer: SELF-PAY

## 2018-02-13 ENCOUNTER — APPOINTMENT (OUTPATIENT)
Dept: PULMONOLOGY | Facility: CLINIC | Age: 28
End: 2018-02-13

## 2018-02-13 PROCEDURE — 90837 PSYTX W PT 60 MINUTES: CPT

## 2018-02-21 ENCOUNTER — APPOINTMENT (OUTPATIENT)
Dept: PULMONOLOGY | Facility: CLINIC | Age: 28
End: 2018-02-21
Payer: SELF-PAY

## 2018-02-21 PROCEDURE — 90837 PSYTX W PT 60 MINUTES: CPT

## 2018-02-25 ENCOUNTER — RESULT REVIEW (OUTPATIENT)
Age: 28
End: 2018-02-25

## 2018-02-26 ENCOUNTER — APPOINTMENT (OUTPATIENT)
Dept: OBGYN | Facility: CLINIC | Age: 28
End: 2018-02-26
Payer: COMMERCIAL

## 2018-02-26 PROCEDURE — 99395 PREV VISIT EST AGE 18-39: CPT

## 2018-03-07 ENCOUNTER — APPOINTMENT (OUTPATIENT)
Dept: PULMONOLOGY | Facility: CLINIC | Age: 28
End: 2018-03-07

## 2018-03-14 ENCOUNTER — APPOINTMENT (OUTPATIENT)
Dept: PULMONOLOGY | Facility: CLINIC | Age: 28
End: 2018-03-14
Payer: SELF-PAY

## 2018-03-14 PROCEDURE — 90834 PSYTX W PT 45 MINUTES: CPT

## 2018-03-28 ENCOUNTER — APPOINTMENT (OUTPATIENT)
Dept: PULMONOLOGY | Facility: CLINIC | Age: 28
End: 2018-03-28
Payer: SELF-PAY

## 2018-03-28 ENCOUNTER — MEDICATION RENEWAL (OUTPATIENT)
Age: 28
End: 2018-03-28

## 2018-03-28 PROCEDURE — 90837 PSYTX W PT 60 MINUTES: CPT

## 2018-03-29 ENCOUNTER — RESULT REVIEW (OUTPATIENT)
Age: 28
End: 2018-03-29

## 2018-03-30 ENCOUNTER — APPOINTMENT (OUTPATIENT)
Dept: OBGYN | Facility: CLINIC | Age: 28
End: 2018-03-30
Payer: COMMERCIAL

## 2018-03-30 PROCEDURE — 57454 BX/CURETT OF CERVIX W/SCOPE: CPT

## 2018-04-04 ENCOUNTER — APPOINTMENT (OUTPATIENT)
Dept: PULMONOLOGY | Facility: CLINIC | Age: 28
End: 2018-04-04
Payer: SELF-PAY

## 2018-04-04 PROCEDURE — 90837 PSYTX W PT 60 MINUTES: CPT

## 2018-04-11 ENCOUNTER — APPOINTMENT (OUTPATIENT)
Dept: PULMONOLOGY | Facility: CLINIC | Age: 28
End: 2018-04-11
Payer: SELF-PAY

## 2018-04-11 PROCEDURE — 90837 PSYTX W PT 60 MINUTES: CPT

## 2018-04-12 ENCOUNTER — APPOINTMENT (OUTPATIENT)
Dept: OTOLARYNGOLOGY | Facility: CLINIC | Age: 28
End: 2018-04-12
Payer: COMMERCIAL

## 2018-04-12 VITALS
BODY MASS INDEX: 21.26 KG/M2 | SYSTOLIC BLOOD PRESSURE: 123 MMHG | HEART RATE: 87 BPM | HEIGHT: 63 IN | DIASTOLIC BLOOD PRESSURE: 67 MMHG | WEIGHT: 120 LBS

## 2018-04-12 DIAGNOSIS — R09.81 NASAL CONGESTION: ICD-10-CM

## 2018-04-12 DIAGNOSIS — R68.89 OTHER GENERAL SYMPTOMS AND SIGNS: ICD-10-CM

## 2018-04-12 DIAGNOSIS — F45.8 OTHER SOMATOFORM DISORDERS: ICD-10-CM

## 2018-04-12 DIAGNOSIS — J30.0 VASOMOTOR RHINITIS: ICD-10-CM

## 2018-04-12 DIAGNOSIS — J34.3 HYPERTROPHY OF NASAL TURBINATES: ICD-10-CM

## 2018-04-12 PROCEDURE — 95004 PERQ TESTS W/ALRGNC XTRCS: CPT

## 2018-04-12 PROCEDURE — 31231 NASAL ENDOSCOPY DX: CPT

## 2018-04-12 PROCEDURE — 99204 OFFICE O/P NEW MOD 45 MIN: CPT | Mod: 25

## 2018-04-12 RX ORDER — SALINE NASAL SPRAY 1.5 OZ
0.65 SOLUTION NASAL
Qty: 44 | Refills: 0 | Status: COMPLETED | COMMUNITY
Start: 2018-03-26

## 2018-04-12 RX ORDER — AMOXICILLIN AND CLAVULANATE POTASSIUM 400; 57 MG/1; MG/1
400-57 TABLET, CHEWABLE ORAL
Qty: 30 | Refills: 0 | Status: COMPLETED | COMMUNITY
Start: 2017-08-22

## 2018-04-12 RX ORDER — KETOCONAZOLE 20.5 MG/ML
2 SHAMPOO, SUSPENSION TOPICAL
Qty: 120 | Refills: 0 | Status: COMPLETED | COMMUNITY
Start: 2017-10-17

## 2018-04-12 RX ORDER — SERTRALINE 25 MG/1
25 TABLET, FILM COATED ORAL
Qty: 30 | Refills: 0 | Status: COMPLETED | COMMUNITY
Start: 2017-09-16

## 2018-04-12 RX ORDER — RIFAXIMIN 550 MG/1
550 TABLET ORAL
Qty: 42 | Refills: 0 | Status: COMPLETED | COMMUNITY
Start: 2017-07-11

## 2018-04-12 RX ORDER — HYDROCORTISONE 2.5% 25 MG/G
2.5 CREAM TOPICAL
Qty: 30 | Refills: 0 | Status: COMPLETED | COMMUNITY
Start: 2017-07-11

## 2018-04-12 RX ORDER — ZOLPIDEM TARTRATE 10 MG/1
10 TABLET ORAL
Qty: 14 | Refills: 0 | Status: COMPLETED | COMMUNITY
Start: 2017-11-07

## 2018-04-12 RX ORDER — PROMETHAZINE HYDROCHLORIDE AND DEXTROMETHORPHAN HYDROBROMIDE ORAL SOLUTION 15; 6.25 MG/5ML; MG/5ML
6.25-15 SOLUTION ORAL
Qty: 150 | Refills: 0 | Status: COMPLETED | COMMUNITY
Start: 2017-10-17

## 2018-04-12 RX ORDER — SULFAMETHOXAZOLE AND TRIMETHOPRIM 800; 160 MG/1; MG/1
800-160 TABLET ORAL
Qty: 14 | Refills: 0 | Status: COMPLETED | COMMUNITY
Start: 2018-03-19

## 2018-04-12 RX ORDER — AZELASTINE HYDROCHLORIDE 137 UG/1
0.1 SPRAY, METERED NASAL
Qty: 60 | Refills: 0 | Status: COMPLETED | COMMUNITY
Start: 2018-03-26

## 2018-04-12 RX ORDER — RANITIDINE 150 MG/1
150 TABLET ORAL
Qty: 30 | Refills: 0 | Status: COMPLETED | COMMUNITY
Start: 2017-08-22

## 2018-04-12 RX ORDER — LIDOCAINE 5 G/100G
5 OINTMENT TOPICAL
Qty: 50 | Refills: 0 | Status: COMPLETED | COMMUNITY
Start: 2017-09-07

## 2018-04-12 RX ORDER — OSELTAMIVIR PHOSPHATE 75 MG/1
75 CAPSULE ORAL
Qty: 10 | Refills: 0 | Status: COMPLETED | COMMUNITY
Start: 2018-02-24

## 2018-04-12 RX ORDER — HALOBETASOL PROPIONATE 0.5 MG/G
0.05 OINTMENT TOPICAL
Qty: 50 | Refills: 0 | Status: COMPLETED | COMMUNITY
Start: 2017-10-03

## 2018-04-12 RX ORDER — TEMAZEPAM 15 MG/1
15 CAPSULE ORAL
Qty: 30 | Refills: 0 | Status: COMPLETED | COMMUNITY
Start: 2017-10-27

## 2018-04-12 RX ORDER — PROMETHAZINE HYDROCHLORIDE 6.25 MG/5ML
6.25 SOLUTION ORAL
Qty: 150 | Refills: 0 | Status: COMPLETED | COMMUNITY
Start: 2018-02-15

## 2018-04-12 RX ORDER — AMOXICILLIN 875 MG/1
875 TABLET, FILM COATED ORAL
Qty: 20 | Refills: 0 | Status: COMPLETED | COMMUNITY
Start: 2018-03-10

## 2018-04-12 RX ORDER — SUCRALFATE 1 G/10ML
1 SUSPENSION ORAL
Qty: 450 | Refills: 0 | Status: COMPLETED | COMMUNITY
Start: 2017-09-25

## 2018-04-16 ENCOUNTER — APPOINTMENT (OUTPATIENT)
Dept: OBGYN | Facility: CLINIC | Age: 28
End: 2018-04-16
Payer: COMMERCIAL

## 2018-04-16 PROCEDURE — 99214 OFFICE O/P EST MOD 30 MIN: CPT

## 2018-04-18 ENCOUNTER — APPOINTMENT (OUTPATIENT)
Dept: HEMATOLOGY ONCOLOGY | Facility: CLINIC | Age: 28
End: 2018-04-18

## 2018-04-18 ENCOUNTER — APPOINTMENT (OUTPATIENT)
Dept: PULMONOLOGY | Facility: CLINIC | Age: 28
End: 2018-04-18
Payer: SELF-PAY

## 2018-04-18 PROCEDURE — 90837 PSYTX W PT 60 MINUTES: CPT

## 2018-04-25 ENCOUNTER — APPOINTMENT (OUTPATIENT)
Dept: PULMONOLOGY | Facility: CLINIC | Age: 28
End: 2018-04-25
Payer: SELF-PAY

## 2018-04-25 PROCEDURE — 90837 PSYTX W PT 60 MINUTES: CPT

## 2018-05-02 ENCOUNTER — APPOINTMENT (OUTPATIENT)
Dept: PULMONOLOGY | Facility: CLINIC | Age: 28
End: 2018-05-02
Payer: SELF-PAY

## 2018-05-02 PROCEDURE — 90837 PSYTX W PT 60 MINUTES: CPT

## 2018-05-04 ENCOUNTER — OUTPATIENT (OUTPATIENT)
Dept: OUTPATIENT SERVICES | Facility: HOSPITAL | Age: 28
LOS: 1 days | Discharge: HOME | End: 2018-05-04

## 2018-05-04 DIAGNOSIS — R79.1 ABNORMAL COAGULATION PROFILE: ICD-10-CM

## 2018-05-07 ENCOUNTER — RESULT REVIEW (OUTPATIENT)
Age: 28
End: 2018-05-07

## 2018-05-07 ENCOUNTER — APPOINTMENT (OUTPATIENT)
Dept: HEMATOLOGY ONCOLOGY | Facility: CLINIC | Age: 28
End: 2018-05-07
Payer: COMMERCIAL

## 2018-05-07 VITALS
BODY MASS INDEX: 22.26 KG/M2 | OXYGEN SATURATION: 99 % | TEMPERATURE: 98.1 F | SYSTOLIC BLOOD PRESSURE: 118 MMHG | WEIGHT: 125.66 LBS | RESPIRATION RATE: 16 BRPM | DIASTOLIC BLOOD PRESSURE: 81 MMHG | HEART RATE: 86 BPM

## 2018-05-07 LAB
BASOPHILS # BLD AUTO: 0 K/UL — SIGNIFICANT CHANGE UP (ref 0–0.2)
BASOPHILS NFR BLD AUTO: 0.4 % — SIGNIFICANT CHANGE UP (ref 0–2)
EOSINOPHIL # BLD AUTO: 0.5 K/UL — SIGNIFICANT CHANGE UP (ref 0–0.5)
EOSINOPHIL NFR BLD AUTO: 10.7 % — HIGH (ref 0–6)
FACT VII ACT/NOR PPP: 25 %
HCT VFR BLD CALC: 41.5 % — SIGNIFICANT CHANGE UP (ref 34.5–45)
HGB BLD-MCNC: 14.6 G/DL — SIGNIFICANT CHANGE UP (ref 11.5–15.5)
INR PPP: 1.41 RATIO
LYMPHOCYTES # BLD AUTO: 1.4 K/UL — SIGNIFICANT CHANGE UP (ref 1–3.3)
LYMPHOCYTES # BLD AUTO: 30.8 % — SIGNIFICANT CHANGE UP (ref 13–44)
MCHC RBC-ENTMCNC: 30.4 PG — SIGNIFICANT CHANGE UP (ref 27–34)
MCHC RBC-ENTMCNC: 35.2 G/DL — SIGNIFICANT CHANGE UP (ref 32–36)
MCV RBC AUTO: 86.5 FL — SIGNIFICANT CHANGE UP (ref 80–100)
MONOCYTES # BLD AUTO: 0.3 K/UL — SIGNIFICANT CHANGE UP (ref 0–0.9)
MONOCYTES NFR BLD AUTO: 7.1 % — SIGNIFICANT CHANGE UP (ref 2–14)
NEUTROPHILS # BLD AUTO: 2.3 K/UL — SIGNIFICANT CHANGE UP (ref 1.8–7.4)
NEUTROPHILS NFR BLD AUTO: 50.9 % — SIGNIFICANT CHANGE UP (ref 43–77)
PLATELET # BLD AUTO: 278 K/UL — SIGNIFICANT CHANGE UP (ref 150–400)
PT BLD: 16.1 SEC
RBC # BLD: 4.8 M/UL — SIGNIFICANT CHANGE UP (ref 3.8–5.2)
RBC # FLD: 11.1 % — SIGNIFICANT CHANGE UP (ref 10.3–14.5)
WBC # BLD: 4.5 K/UL — SIGNIFICANT CHANGE UP (ref 3.8–10.5)
WBC # FLD AUTO: 4.5 K/UL — SIGNIFICANT CHANGE UP (ref 3.8–10.5)

## 2018-05-07 PROCEDURE — 99214 OFFICE O/P EST MOD 30 MIN: CPT

## 2018-05-09 ENCOUNTER — APPOINTMENT (OUTPATIENT)
Dept: PULMONOLOGY | Facility: CLINIC | Age: 28
End: 2018-05-09
Payer: SELF-PAY

## 2018-05-09 ENCOUNTER — APPOINTMENT (OUTPATIENT)
Dept: GASTROENTEROLOGY | Facility: CLINIC | Age: 28
End: 2018-05-09
Payer: COMMERCIAL

## 2018-05-09 VITALS
DIASTOLIC BLOOD PRESSURE: 78 MMHG | SYSTOLIC BLOOD PRESSURE: 104 MMHG | WEIGHT: 125 LBS | HEIGHT: 63 IN | HEART RATE: 99 BPM | BODY MASS INDEX: 22.15 KG/M2

## 2018-05-09 DIAGNOSIS — K62.5 HEMORRHAGE OF ANUS AND RECTUM: ICD-10-CM

## 2018-05-09 PROCEDURE — 90834 PSYTX W PT 45 MINUTES: CPT

## 2018-05-09 PROCEDURE — 99214 OFFICE O/P EST MOD 30 MIN: CPT

## 2018-05-09 RX ORDER — LIDOCAINE HYDROCHLORIDE 20 MG/ML
2 SOLUTION OROPHARYNGEAL
Qty: 100 | Refills: 0 | Status: DISCONTINUED | COMMUNITY
Start: 2018-05-05

## 2018-05-14 ENCOUNTER — APPOINTMENT (OUTPATIENT)
Dept: DERMATOLOGY | Facility: CLINIC | Age: 28
End: 2018-05-14
Payer: COMMERCIAL

## 2018-05-14 VITALS
SYSTOLIC BLOOD PRESSURE: 104 MMHG | HEIGHT: 63 IN | DIASTOLIC BLOOD PRESSURE: 68 MMHG | BODY MASS INDEX: 22.15 KG/M2 | WEIGHT: 125 LBS

## 2018-05-14 DIAGNOSIS — L21.9 SEBORRHEIC DERMATITIS, UNSPECIFIED: ICD-10-CM

## 2018-05-14 DIAGNOSIS — L60.8 OTHER NAIL DISORDERS: ICD-10-CM

## 2018-05-14 DIAGNOSIS — L70.0 ACNE VULGARIS: ICD-10-CM

## 2018-05-14 PROCEDURE — 99213 OFFICE O/P EST LOW 20 MIN: CPT

## 2018-05-16 ENCOUNTER — APPOINTMENT (OUTPATIENT)
Dept: PULMONOLOGY | Facility: CLINIC | Age: 28
End: 2018-05-16
Payer: SELF-PAY

## 2018-05-16 ENCOUNTER — APPOINTMENT (OUTPATIENT)
Dept: PULMONOLOGY | Facility: CLINIC | Age: 28
End: 2018-05-16

## 2018-05-16 PROCEDURE — 90837 PSYTX W PT 60 MINUTES: CPT

## 2018-05-17 ENCOUNTER — OUTPATIENT (OUTPATIENT)
Dept: OUTPATIENT SERVICES | Facility: HOSPITAL | Age: 28
LOS: 1 days | End: 2018-05-17
Payer: COMMERCIAL

## 2018-05-17 VITALS
SYSTOLIC BLOOD PRESSURE: 107 MMHG | OXYGEN SATURATION: 98 % | DIASTOLIC BLOOD PRESSURE: 72 MMHG | HEART RATE: 91 BPM | TEMPERATURE: 98 F | RESPIRATION RATE: 18 BRPM | HEIGHT: 62 IN | WEIGHT: 123.9 LBS

## 2018-05-17 DIAGNOSIS — Z01.818 ENCOUNTER FOR OTHER PREPROCEDURAL EXAMINATION: ICD-10-CM

## 2018-05-17 DIAGNOSIS — N87.1 MODERATE CERVICAL DYSPLASIA: ICD-10-CM

## 2018-05-17 DIAGNOSIS — D68.2 HEREDITARY DEFICIENCY OF OTHER CLOTTING FACTORS: ICD-10-CM

## 2018-05-17 LAB
HCT VFR BLD CALC: 41.5 % — SIGNIFICANT CHANGE UP (ref 34.5–45)
HGB BLD-MCNC: 13.5 G/DL — SIGNIFICANT CHANGE UP (ref 11.5–15.5)
MCHC RBC-ENTMCNC: 28.6 PG — SIGNIFICANT CHANGE UP (ref 27–34)
MCHC RBC-ENTMCNC: 32.5 GM/DL — SIGNIFICANT CHANGE UP (ref 32–36)
MCV RBC AUTO: 87.9 FL — SIGNIFICANT CHANGE UP (ref 80–100)
PLATELET # BLD AUTO: 294 K/UL — SIGNIFICANT CHANGE UP (ref 150–400)
RBC # BLD: 4.72 M/UL — SIGNIFICANT CHANGE UP (ref 3.8–5.2)
RBC # FLD: 12.4 % — SIGNIFICANT CHANGE UP (ref 10.3–14.5)
WBC # BLD: 5.18 K/UL — SIGNIFICANT CHANGE UP (ref 3.8–10.5)
WBC # FLD AUTO: 5.18 K/UL — SIGNIFICANT CHANGE UP (ref 3.8–10.5)

## 2018-05-17 PROCEDURE — 85027 COMPLETE CBC AUTOMATED: CPT

## 2018-05-17 PROCEDURE — G0463: CPT

## 2018-05-17 RX ORDER — LIDOCAINE HCL 20 MG/ML
0.2 VIAL (ML) INJECTION ONCE
Qty: 0 | Refills: 0 | Status: DISCONTINUED | OUTPATIENT
Start: 2018-05-31 | End: 2018-06-15

## 2018-05-17 RX ORDER — ACETAMINOPHEN 500 MG
1000 TABLET ORAL ONCE
Qty: 0 | Refills: 0 | Status: COMPLETED | OUTPATIENT
Start: 2018-05-31 | End: 2018-05-31

## 2018-05-17 RX ORDER — SODIUM CHLORIDE 9 MG/ML
3 INJECTION INTRAMUSCULAR; INTRAVENOUS; SUBCUTANEOUS EVERY 8 HOURS
Qty: 0 | Refills: 0 | Status: DISCONTINUED | OUTPATIENT
Start: 2018-05-31 | End: 2018-06-15

## 2018-05-17 NOTE — H&P PST ADULT - PROBLEM SELECTOR PLAN 2
Obtain hematology note, patient reports that she spoke to hematologist and she said there was no need for replacement therapy pre or post operatively

## 2018-05-17 NOTE — H&P PST ADULT - HISTORY OF PRESENT ILLNESS
29 y/o F PMH abnormal PAP smear, presents today for LEEP. 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

## 2018-05-17 NOTE — H&P PST ADULT - ATTENDING COMMENTS
Pt w JOSEPHINE 2 on colpo biopsy, discussed with pt risks/benefits of LEEP procedure. All questions answered

## 2018-05-23 ENCOUNTER — APPOINTMENT (OUTPATIENT)
Dept: PULMONOLOGY | Facility: CLINIC | Age: 28
End: 2018-05-23
Payer: SELF-PAY

## 2018-05-23 PROCEDURE — 90837 PSYTX W PT 60 MINUTES: CPT

## 2018-05-30 ENCOUNTER — TRANSCRIPTION ENCOUNTER (OUTPATIENT)
Age: 28
End: 2018-05-30

## 2018-05-30 ENCOUNTER — APPOINTMENT (OUTPATIENT)
Dept: PULMONOLOGY | Facility: CLINIC | Age: 28
End: 2018-05-30
Payer: SELF-PAY

## 2018-05-30 PROCEDURE — 90837 PSYTX W PT 60 MINUTES: CPT

## 2018-05-31 ENCOUNTER — OUTPATIENT (OUTPATIENT)
Dept: OUTPATIENT SERVICES | Facility: HOSPITAL | Age: 28
LOS: 1 days | End: 2018-05-31
Payer: COMMERCIAL

## 2018-05-31 ENCOUNTER — APPOINTMENT (OUTPATIENT)
Dept: OBGYN | Facility: HOSPITAL | Age: 28
End: 2018-05-31

## 2018-05-31 ENCOUNTER — RESULT REVIEW (OUTPATIENT)
Age: 28
End: 2018-05-31

## 2018-05-31 VITALS
WEIGHT: 123.9 LBS | HEIGHT: 62 IN | RESPIRATION RATE: 18 BRPM | HEART RATE: 77 BPM | SYSTOLIC BLOOD PRESSURE: 110 MMHG | DIASTOLIC BLOOD PRESSURE: 72 MMHG | TEMPERATURE: 98 F | OXYGEN SATURATION: 100 %

## 2018-05-31 VITALS
SYSTOLIC BLOOD PRESSURE: 105 MMHG | HEART RATE: 77 BPM | TEMPERATURE: 98 F | DIASTOLIC BLOOD PRESSURE: 56 MMHG | RESPIRATION RATE: 16 BRPM | OXYGEN SATURATION: 98 %

## 2018-05-31 DIAGNOSIS — Z01.818 ENCOUNTER FOR OTHER PREPROCEDURAL EXAMINATION: ICD-10-CM

## 2018-05-31 DIAGNOSIS — N87.1 MODERATE CERVICAL DYSPLASIA: ICD-10-CM

## 2018-05-31 DIAGNOSIS — Z98.890 OTHER SPECIFIED POSTPROCEDURAL STATES: Chronic | ICD-10-CM

## 2018-05-31 PROCEDURE — 57522 CONIZATION OF CERVIX: CPT

## 2018-05-31 PROCEDURE — 57461 CONZ OF CERVIX W/SCOPE LEEP: CPT | Mod: 80

## 2018-05-31 PROCEDURE — 88305 TISSUE EXAM BY PATHOLOGIST: CPT

## 2018-05-31 PROCEDURE — 88307 TISSUE EXAM BY PATHOLOGIST: CPT | Mod: 26

## 2018-05-31 PROCEDURE — C1889: CPT

## 2018-05-31 PROCEDURE — 57461 CONZ OF CERVIX W/SCOPE LEEP: CPT

## 2018-05-31 PROCEDURE — 88307 TISSUE EXAM BY PATHOLOGIST: CPT

## 2018-05-31 PROCEDURE — 88305 TISSUE EXAM BY PATHOLOGIST: CPT | Mod: 26

## 2018-05-31 RX ORDER — SODIUM CHLORIDE 9 MG/ML
1000 INJECTION, SOLUTION INTRAVENOUS
Qty: 0 | Refills: 0 | Status: DISCONTINUED | OUTPATIENT
Start: 2018-05-31 | End: 2018-06-15

## 2018-05-31 RX ORDER — ONDANSETRON 8 MG/1
4 TABLET, FILM COATED ORAL ONCE
Qty: 0 | Refills: 0 | Status: DISCONTINUED | OUTPATIENT
Start: 2018-05-31 | End: 2018-06-15

## 2018-05-31 RX ORDER — CELECOXIB 200 MG/1
200 CAPSULE ORAL ONCE
Qty: 0 | Refills: 0 | Status: COMPLETED | OUTPATIENT
Start: 2018-05-31 | End: 2018-05-31

## 2018-05-31 RX ORDER — OXYCODONE HYDROCHLORIDE 5 MG/1
5 TABLET ORAL ONCE
Qty: 0 | Refills: 0 | Status: DISCONTINUED | OUTPATIENT
Start: 2018-05-31 | End: 2018-05-31

## 2018-05-31 RX ORDER — CELECOXIB 200 MG/1
200 CAPSULE ORAL ONCE
Qty: 0 | Refills: 0 | Status: DISCONTINUED | OUTPATIENT
Start: 2018-05-31 | End: 2018-06-15

## 2018-05-31 RX ADMIN — CELECOXIB 200 MILLIGRAM(S): 200 CAPSULE ORAL at 09:34

## 2018-05-31 RX ADMIN — Medication 1000 MILLIGRAM(S): at 09:34

## 2018-05-31 NOTE — PRE-ANESTHESIA EVALUATION ADULT - NSANTHPMHFT_GEN_ALL_CORE
hematologist recommendation -  GERD - mild, food related, no recent symptoms hematologist recommendation -levels >20%, no clinical symptoms, has had colonoscopy with polypectomy and no significant bleeding  hematologist recommends no factor VII prophylactically   GERD - mild, food related, no recent symptoms

## 2018-05-31 NOTE — ASU PATIENT PROFILE, ADULT - PSH
No significant past surgical history History of colonoscopy  Feb 2017 (Dx GERD) History of colonoscopy  Feb 2017 (Dx IBS/GERD)

## 2018-05-31 NOTE — ASU PATIENT PROFILE, ADULT - PMH
Depression    Factor VII deficiency    GERD (gastroesophageal reflux disease)    Right ovarian cyst Depression    Factor VII deficiency    GERD (gastroesophageal reflux disease)    Other hemorrhoids  Internal and External  Right ovarian cyst Depression    Factor VII deficiency    GERD (gastroesophageal reflux disease)    Irritable bowel syndrome, unspecified type    Other hemorrhoids  Internal and External  Right ovarian cyst

## 2018-05-31 NOTE — ASU DISCHARGE PLAN (ADULT/PEDIATRIC). - ASU FOLLOWUP
[facility]:  [place] for Ambulatory Surgery... 911 or go to the nearest Emergency Room

## 2018-05-31 NOTE — BRIEF OPERATIVE NOTE - PROCEDURE
Endocervical curettage  05/31/2018    Active  SHREYA  LEEP  05/31/2018    Active  SHREYA <<-----Click on this checkbox to enter Procedure

## 2018-05-31 NOTE — ASU DISCHARGE PLAN (ADULT/PEDIATRIC). - NOTIFY
Pain not relieved by Medications/Excessive Diarrhea/Fever greater than 101/GYN Fever>100.4/Numbness, tingling/Inability to Tolerate Liquids or Foods/Persistent Nausea and Vomiting/Unable to Urinate

## 2018-06-02 RX ORDER — OXYCODONE HYDROCHLORIDE 5 MG/1
5 TABLET ORAL
Qty: 150 | Refills: 0
Start: 2018-06-02 | End: 2018-06-06

## 2018-06-10 LAB — SURGICAL PATHOLOGY STUDY: SIGNIFICANT CHANGE UP

## 2018-06-11 ENCOUNTER — APPOINTMENT (OUTPATIENT)
Dept: OBGYN | Facility: CLINIC | Age: 28
End: 2018-06-11
Payer: COMMERCIAL

## 2018-06-11 PROCEDURE — 99213 OFFICE O/P EST LOW 20 MIN: CPT

## 2018-06-13 ENCOUNTER — APPOINTMENT (OUTPATIENT)
Dept: PULMONOLOGY | Facility: CLINIC | Age: 28
End: 2018-06-13
Payer: SELF-PAY

## 2018-06-13 PROCEDURE — 90837 PSYTX W PT 60 MINUTES: CPT

## 2018-06-15 ENCOUNTER — APPOINTMENT (OUTPATIENT)
Dept: OBGYN | Facility: CLINIC | Age: 28
End: 2018-06-15

## 2018-06-20 ENCOUNTER — APPOINTMENT (OUTPATIENT)
Dept: PULMONOLOGY | Facility: CLINIC | Age: 28
End: 2018-06-20
Payer: COMMERCIAL

## 2018-06-20 PROCEDURE — 90837 PSYTX W PT 60 MINUTES: CPT

## 2018-06-27 ENCOUNTER — APPOINTMENT (OUTPATIENT)
Dept: PULMONOLOGY | Facility: CLINIC | Age: 28
End: 2018-06-27
Payer: COMMERCIAL

## 2018-06-27 PROCEDURE — 90837 PSYTX W PT 60 MINUTES: CPT

## 2018-07-11 ENCOUNTER — APPOINTMENT (OUTPATIENT)
Dept: PULMONOLOGY | Facility: CLINIC | Age: 28
End: 2018-07-11
Payer: COMMERCIAL

## 2018-07-11 PROCEDURE — 0: CUSTOM

## 2018-07-11 PROCEDURE — 90837 PSYTX W PT 60 MINUTES: CPT

## 2018-07-18 ENCOUNTER — APPOINTMENT (OUTPATIENT)
Dept: PULMONOLOGY | Facility: CLINIC | Age: 28
End: 2018-07-18
Payer: COMMERCIAL

## 2018-07-18 PROCEDURE — 90837 PSYTX W PT 60 MINUTES: CPT

## 2018-07-24 ENCOUNTER — APPOINTMENT (OUTPATIENT)
Dept: OTOLARYNGOLOGY | Facility: CLINIC | Age: 28
End: 2018-07-24

## 2018-07-25 ENCOUNTER — APPOINTMENT (OUTPATIENT)
Dept: PULMONOLOGY | Facility: CLINIC | Age: 28
End: 2018-07-25

## 2018-07-31 ENCOUNTER — APPOINTMENT (OUTPATIENT)
Dept: PULMONOLOGY | Facility: CLINIC | Age: 28
End: 2018-07-31

## 2018-08-01 ENCOUNTER — APPOINTMENT (OUTPATIENT)
Dept: PULMONOLOGY | Facility: CLINIC | Age: 28
End: 2018-08-01
Payer: COMMERCIAL

## 2018-08-01 PROCEDURE — 90837 PSYTX W PT 60 MINUTES: CPT

## 2018-08-03 ENCOUNTER — APPOINTMENT (OUTPATIENT)
Dept: GASTROENTEROLOGY | Facility: CLINIC | Age: 28
End: 2018-08-03

## 2018-08-16 ENCOUNTER — APPOINTMENT (OUTPATIENT)
Dept: DERMATOLOGY | Facility: CLINIC | Age: 28
End: 2018-08-16

## 2018-08-22 ENCOUNTER — APPOINTMENT (OUTPATIENT)
Dept: PULMONOLOGY | Facility: CLINIC | Age: 28
End: 2018-08-22
Payer: SELF-PAY

## 2018-08-22 PROBLEM — N83.201 UNSPECIFIED OVARIAN CYST, RIGHT SIDE: Chronic | Status: ACTIVE | Noted: 2017-02-20

## 2018-08-22 PROBLEM — D68.2 HEREDITARY DEFICIENCY OF OTHER CLOTTING FACTORS: Chronic | Status: ACTIVE | Noted: 2017-02-20

## 2018-08-22 PROBLEM — K58.9 IRRITABLE BOWEL SYNDROME, UNSPECIFIED: Chronic | Status: ACTIVE | Noted: 2018-05-31

## 2018-08-22 PROBLEM — K58.9 IRRITABLE BOWEL SYNDROME WITHOUT DIARRHEA: Chronic | Status: ACTIVE | Noted: 2018-05-31

## 2018-08-22 PROBLEM — K21.9 GASTRO-ESOPHAGEAL REFLUX DISEASE WITHOUT ESOPHAGITIS: Chronic | Status: ACTIVE | Noted: 2017-09-16

## 2018-08-22 PROCEDURE — 90837 PSYTX W PT 60 MINUTES: CPT

## 2018-08-26 ENCOUNTER — EMERGENCY (EMERGENCY)
Facility: HOSPITAL | Age: 28
LOS: 1 days | Discharge: HOME | End: 2018-08-26
Attending: EMERGENCY MEDICINE | Admitting: EMERGENCY MEDICINE
Payer: COMMERCIAL

## 2018-08-26 VITALS
OXYGEN SATURATION: 100 % | RESPIRATION RATE: 18 BRPM | DIASTOLIC BLOOD PRESSURE: 78 MMHG | HEART RATE: 80 BPM | TEMPERATURE: 98 F | SYSTOLIC BLOOD PRESSURE: 116 MMHG

## 2018-08-26 DIAGNOSIS — Z98.890 OTHER SPECIFIED POSTPROCEDURAL STATES: Chronic | ICD-10-CM

## 2018-08-26 LAB
BASOPHILS # BLD AUTO: 0.01 K/UL — SIGNIFICANT CHANGE UP (ref 0–0.2)
BASOPHILS NFR BLD AUTO: 0.2 % — SIGNIFICANT CHANGE UP (ref 0–2)
D DIMER BLD IA.RAPID-MCNC: < 150 NG/ML — SIGNIFICANT CHANGE UP
EOSINOPHIL # BLD AUTO: 0.09 K/UL — SIGNIFICANT CHANGE UP (ref 0–0.5)
EOSINOPHIL NFR BLD AUTO: 1.7 % — SIGNIFICANT CHANGE UP (ref 0–6)
HCT VFR BLD CALC: 43 % — SIGNIFICANT CHANGE UP (ref 34.5–45)
HGB BLD-MCNC: 13.7 G/DL — SIGNIFICANT CHANGE UP (ref 11.5–15.5)
IMM GRANULOCYTES # BLD AUTO: 0.01 # — SIGNIFICANT CHANGE UP
IMM GRANULOCYTES NFR BLD AUTO: 0.2 % — SIGNIFICANT CHANGE UP (ref 0–1.5)
INR BLD: 1.42 — HIGH (ref 0.88–1.17)
LYMPHOCYTES # BLD AUTO: 1.65 K/UL — SIGNIFICANT CHANGE UP (ref 1–3.3)
LYMPHOCYTES # BLD AUTO: 30.9 % — SIGNIFICANT CHANGE UP (ref 13–44)
MCHC RBC-ENTMCNC: 27.8 PG — SIGNIFICANT CHANGE UP (ref 27–34)
MCHC RBC-ENTMCNC: 31.9 % — LOW (ref 32–36)
MCV RBC AUTO: 87.4 FL — SIGNIFICANT CHANGE UP (ref 80–100)
MONOCYTES # BLD AUTO: 0.42 K/UL — SIGNIFICANT CHANGE UP (ref 0–0.9)
MONOCYTES NFR BLD AUTO: 7.9 % — SIGNIFICANT CHANGE UP (ref 2–14)
NEUTROPHILS # BLD AUTO: 3.16 K/UL — SIGNIFICANT CHANGE UP (ref 1.8–7.4)
NEUTROPHILS NFR BLD AUTO: 59.1 % — SIGNIFICANT CHANGE UP (ref 43–77)
NRBC # FLD: 0 — SIGNIFICANT CHANGE UP
PLATELET # BLD AUTO: 282 K/UL — SIGNIFICANT CHANGE UP (ref 150–400)
PMV BLD: 9.8 FL — SIGNIFICANT CHANGE UP (ref 7–13)
PROTHROM AB SERPL-ACNC: 15.9 SEC — HIGH (ref 9.8–13.1)
RBC # BLD: 4.92 M/UL — SIGNIFICANT CHANGE UP (ref 3.8–5.2)
RBC # FLD: 12.3 % — SIGNIFICANT CHANGE UP (ref 10.3–14.5)
WBC # BLD: 5.34 K/UL — SIGNIFICANT CHANGE UP (ref 3.8–10.5)
WBC # FLD AUTO: 5.34 K/UL — SIGNIFICANT CHANGE UP (ref 3.8–10.5)

## 2018-08-26 PROCEDURE — 99284 EMERGENCY DEPT VISIT MOD MDM: CPT

## 2018-08-26 RX ORDER — ACETAMINOPHEN 500 MG
2 TABLET ORAL
Qty: 30 | Refills: 0
Start: 2018-08-26

## 2018-08-26 RX ORDER — IBUPROFEN 200 MG
600 TABLET ORAL ONCE
Qty: 0 | Refills: 0 | Status: COMPLETED | OUTPATIENT
Start: 2018-08-26 | End: 2018-08-26

## 2018-08-26 NOTE — ED PROVIDER NOTE - ATTENDING CONTRIBUTION TO CARE
AJM: Patient seen with PA and agree with above note. 27 y/o female pmh IBS, factor VII deficiency c/o chest pain x2 weeks. Pt admits to midsternal chest pain x2 weeks, worse with palpation, movement and inspiration. Pt states that the pain began after travelling from [place]. Pt admits to intermittent pain, now constant since this morning. Pt denies sob, palpitations, diaphoresis, n/v/d, numbness, tingling, weakness, dizziness, syncope, fever or chills. Denies leg swelling or calf pain. Denies OCPs or smoking. + sternal ttp. Normal breast exam (mitra [place] chaperone). no rashes. labs, ecg, cxr, d-dimer. nsaids. likely muscular chest pain

## 2018-08-26 NOTE — ED PROVIDER NOTE - MEDICAL DECISION MAKING DETAILS
27 y/o female w/ reproducible chest pain, however pain started after travel, low risk for PE but will check d-dimer, ekg, cxr, reassess

## 2018-08-26 NOTE — ED PROVIDER NOTE - PMH
Depression    Factor VII deficiency    GERD (gastroesophageal reflux disease)    Irritable bowel syndrome, unspecified type    Other hemorrhoids  Internal and External  Right ovarian cyst

## 2018-08-26 NOTE — ED ADULT TRIAGE NOTE - CHIEF COMPLAINT QUOTE
Pt c/o left breast pain x 2 weeks, more severe this morning. Pt states she's not sure if the pain is in her breast or rib cage. Denies SOB, denies N/V

## 2018-08-26 NOTE — ED PROVIDER NOTE - OBJECTIVE STATEMENT
27 y/o female pmh IBS, factor VII deficiency c/o chest pain x2 weeks. Pt admits to midsternal chest pain x2 weeks, worse with palpation, movement and inspiration. Pt states that the pain began after travelling from [place]. Pt admits to intermittent pain, now constant since this morning. Pt denies sob, palpitations, diaphoresis, n/v/d, numbness, tingling, weakness, dizziness, syncope, fever or chills. Denies leg swelling or calf pain. Denies OCPs or smoking

## 2018-08-26 NOTE — ED PROVIDER NOTE - PROGRESS NOTE DETAILS
VINCE Carbone- Pt refused the cxr stating that she does not want to be exposed to the radiation AJM: work up neg. stable for dc home with pain meds and pmd followup. All results discussed with the patient, and a copy of results has been provided. Patient is comfortable with dc plan for home. Opportunity for questions was provided and all questions have been adressed.

## 2018-08-29 ENCOUNTER — APPOINTMENT (OUTPATIENT)
Dept: PULMONOLOGY | Facility: CLINIC | Age: 28
End: 2018-08-29
Payer: SELF-PAY

## 2018-08-29 PROCEDURE — 90834 PSYTX W PT 45 MINUTES: CPT

## 2018-09-05 ENCOUNTER — APPOINTMENT (OUTPATIENT)
Dept: PULMONOLOGY | Facility: CLINIC | Age: 28
End: 2018-09-05
Payer: SELF-PAY

## 2018-09-05 PROCEDURE — 90834 PSYTX W PT 45 MINUTES: CPT

## 2018-09-10 ENCOUNTER — APPOINTMENT (OUTPATIENT)
Dept: OBGYN | Facility: CLINIC | Age: 28
End: 2018-09-10
Payer: COMMERCIAL

## 2018-09-10 PROCEDURE — 99213 OFFICE O/P EST LOW 20 MIN: CPT

## 2018-09-12 ENCOUNTER — APPOINTMENT (OUTPATIENT)
Dept: PULMONOLOGY | Facility: CLINIC | Age: 28
End: 2018-09-12
Payer: SELF-PAY

## 2018-09-12 PROCEDURE — 90834 PSYTX W PT 45 MINUTES: CPT

## 2018-09-25 ENCOUNTER — APPOINTMENT (OUTPATIENT)
Dept: PULMONOLOGY | Facility: CLINIC | Age: 28
End: 2018-09-25
Payer: SELF-PAY

## 2018-09-25 PROCEDURE — 90837 PSYTX W PT 60 MINUTES: CPT

## 2018-09-26 ENCOUNTER — APPOINTMENT (OUTPATIENT)
Dept: OBGYN | Facility: CLINIC | Age: 28
End: 2018-09-26

## 2018-10-03 ENCOUNTER — APPOINTMENT (OUTPATIENT)
Dept: PULMONOLOGY | Facility: CLINIC | Age: 28
End: 2018-10-03
Payer: SELF-PAY

## 2018-10-03 PROCEDURE — 90837 PSYTX W PT 60 MINUTES: CPT

## 2018-10-05 ENCOUNTER — APPOINTMENT (OUTPATIENT)
Dept: GASTROENTEROLOGY | Facility: CLINIC | Age: 28
End: 2018-10-05

## 2018-10-10 ENCOUNTER — APPOINTMENT (OUTPATIENT)
Dept: PULMONOLOGY | Facility: CLINIC | Age: 28
End: 2018-10-10
Payer: COMMERCIAL

## 2018-10-10 PROCEDURE — 90837 PSYTX W PT 60 MINUTES: CPT

## 2018-10-17 ENCOUNTER — APPOINTMENT (OUTPATIENT)
Dept: PULMONOLOGY | Facility: CLINIC | Age: 28
End: 2018-10-17
Payer: SELF-PAY

## 2018-10-17 PROCEDURE — 90834 PSYTX W PT 45 MINUTES: CPT

## 2018-10-20 NOTE — ED BEHAVIORAL HEALTH ASSESSMENT NOTE - NS ED BHA REVIEW OF ED CHART AVAILABLE INVESTIGATIONS REVIEWED
[facility] [place] 
THE [facility] EMERGENCY DEPT 
[place] [id number] [phone] Work/School Note Date: 10/20/2018 To Whom It May concern: 
 
Kayla Beauchamp was seen and treated today in the emergency room by the following provider(s): 
Attending Provider: Kelli Whipple MD 
Physician Assistant: Jimmie Rodriguez PA-C. Kayla Beauchamp may return to work on 10/23/18. Sincerely, Beth Bloom PA-C 
 
 
 
 None available

## 2018-10-22 ENCOUNTER — APPOINTMENT (OUTPATIENT)
Dept: OBGYN | Facility: CLINIC | Age: 28
End: 2018-10-22
Payer: COMMERCIAL

## 2018-10-22 PROCEDURE — 99213 OFFICE O/P EST LOW 20 MIN: CPT

## 2018-10-23 ENCOUNTER — APPOINTMENT (OUTPATIENT)
Dept: PULMONOLOGY | Facility: CLINIC | Age: 28
End: 2018-10-23
Payer: COMMERCIAL

## 2018-10-23 PROCEDURE — 90837 PSYTX W PT 60 MINUTES: CPT

## 2018-10-31 ENCOUNTER — APPOINTMENT (OUTPATIENT)
Dept: PULMONOLOGY | Facility: CLINIC | Age: 28
End: 2018-10-31
Payer: SELF-PAY

## 2018-10-31 PROCEDURE — 90834 PSYTX W PT 45 MINUTES: CPT

## 2018-11-07 ENCOUNTER — APPOINTMENT (OUTPATIENT)
Dept: PULMONOLOGY | Facility: CLINIC | Age: 28
End: 2018-11-07

## 2018-11-08 ENCOUNTER — APPOINTMENT (OUTPATIENT)
Dept: GASTROENTEROLOGY | Facility: CLINIC | Age: 28
End: 2018-11-08

## 2018-11-14 ENCOUNTER — APPOINTMENT (OUTPATIENT)
Dept: PULMONOLOGY | Facility: CLINIC | Age: 28
End: 2018-11-14
Payer: SELF-PAY

## 2018-11-14 PROCEDURE — 90837 PSYTX W PT 60 MINUTES: CPT

## 2018-11-28 ENCOUNTER — APPOINTMENT (OUTPATIENT)
Dept: PULMONOLOGY | Facility: CLINIC | Age: 28
End: 2018-11-28

## 2018-12-05 ENCOUNTER — APPOINTMENT (OUTPATIENT)
Dept: PULMONOLOGY | Facility: CLINIC | Age: 28
End: 2018-12-05
Payer: SELF-PAY

## 2018-12-05 PROCEDURE — 90837 PSYTX W PT 60 MINUTES: CPT

## 2018-12-07 NOTE — BRIEF OPERATIVE NOTE - OPERATION/FINDINGS
Addendum Note by Nile Fermin CMA at 07/10/17 03:35 PM     Author:  Nile Fermin CMA Service:  (none) Author Type:  Certified Medical Assistant     Filed:  07/10/17 03:35 PM Encounter Date:  7/10/2017 Status:  Signed     :  Marisela Reynoso Idhasoft Lefty (Certified Medical Assistant)       Addended by: Nile Fermin on: 7/10/2017 03:35 PM        Modules accepted: Orders         Revision History        Date/Time User Provider Type Action    > 07/10/17 03:35 PM Colles, Marlin Friedman, [employer] Certified Medical Assistant Sign    Attribution information within the note text is not available.
normal appearing cervix. transformational zone visible

## 2019-01-03 ENCOUNTER — APPOINTMENT (OUTPATIENT)
Dept: GASTROENTEROLOGY | Facility: CLINIC | Age: 29
End: 2019-01-03
Payer: COMMERCIAL

## 2019-01-03 VITALS
BODY MASS INDEX: 22.15 KG/M2 | DIASTOLIC BLOOD PRESSURE: 85 MMHG | HEIGHT: 63 IN | WEIGHT: 125 LBS | SYSTOLIC BLOOD PRESSURE: 107 MMHG | HEART RATE: 96 BPM

## 2019-01-03 DIAGNOSIS — K63.89 OTHER SPECIFIED DISEASES OF INTESTINE: ICD-10-CM

## 2019-01-03 PROCEDURE — 99214 OFFICE O/P EST MOD 30 MIN: CPT

## 2019-01-03 RX ORDER — IPRATROPIUM BROMIDE 21 UG/1
0.03 SPRAY NASAL 3 TIMES DAILY
Qty: 1 | Refills: 2 | Status: DISCONTINUED | COMMUNITY
Start: 2018-04-12 | End: 2019-01-03

## 2019-01-03 RX ORDER — FLUTICASONE PROPIONATE 50 UG/1
50 SPRAY, METERED NASAL DAILY
Qty: 1 | Refills: 2 | Status: DISCONTINUED | COMMUNITY
Start: 2018-04-12 | End: 2019-01-03

## 2019-01-03 RX ORDER — ZOLPIDEM TARTRATE 5 MG/1
5 TABLET ORAL
Qty: 30 | Refills: 3 | Status: DISCONTINUED | COMMUNITY
Start: 2017-09-14 | End: 2019-01-03

## 2019-01-03 RX ORDER — CHLORHEXIDINE GLUCONATE 4 %
1000 LIQUID (ML) TOPICAL
Qty: 30 | Refills: 0 | Status: DISCONTINUED | COMMUNITY
Start: 2017-02-17 | End: 2019-01-03

## 2019-01-03 RX ORDER — KETOCONAZOLE 20.5 MG/ML
2 SHAMPOO, SUSPENSION TOPICAL
Qty: 120 | Refills: 3 | Status: DISCONTINUED | COMMUNITY
Start: 2018-05-14 | End: 2019-01-03

## 2019-01-03 NOTE — ASSESSMENT
[FreeTextEntry1] : 1.  Diarrhea with LLQ discomfort.  May be secondary to SIBO (B12 deficiency, previous positive breath test, response to antibiotics).  Differential includes lactose or other food intolerance, IBS.  Colonoscopy on March 10, 2017 with focally active colitis in descending colon, but otherwise unremarkable.  CT scan in February 2017 with moderate stool in colon, no inflammation. CT scan in June 2017 unremarkable.\par 2.  GERD with esophagitis, stable.  Upper endoscopy in February 16, 2015 with esophagitis and erosive gastritis. \par 3.  Abnormal hydrogen breath testing suggesting small intestinal bacterial overgrowth (vs rapid transit), status post improvement on Xifaxan and Augmentin.\par 4.  Weight loss, improved. \par 5.  History of epistaxis with Factor VII deficiency. \par 6.  Anxiety.\par 7.  Insomnia.\par 8.  CIN1.\par  \par Plan:\par - The patient was advised to resume healthy eating and avoid potential food triggers.\par - Augmentin 875 mg BID x 10 days.\par - B12 supplementation.\par - Lactose free diet.\par - Patient can consider resuming bulking fiber agent.

## 2019-01-03 NOTE — HISTORY OF PRESENT ILLNESS
[Heartburn] : improved heartburn [Nausea] : denies nausea [Vomiting] : denies vomiting [Diarrhea] : diarrhea worsened [Constipation] : denies constipation [Yellow Skin Or Eyes (Jaundice)] : denies jaundice [Abdominal Pain] : abdominal pain worsened [Abdominal Swelling] : denies abdominal swelling [Rectal Pain] : denies rectal pain [_________] : Performed [unfilled] [de-identified] : Angel presents to the office today for follow up with complaints of left-sided abdominal discomfort and loose stools.  She was last seen in the office for constipation and rectal bleeding in May 2018.\par \par Since her last visit, she has had episodic GI symptoms which she has mostly controlled with healthier eating.  She reports that she went to [place] last August and felt well there while eating everything.  She continued eating poorly once she returned home.  She developed oral lichen planus in September and October, which may have been due to drinking while on vacation, and she started using a topical steroid in her mouth.  In November and December, she started experiencing LLQ/periumbilical discomfort, with diarrhea 2 x a day.  She has noted that products with gluten or milk worsen the symptoms.  She has noted that when she used amoxicillin the GI symptoms resolved, similar to when she was treated for SIBO.  Of note, she was also noted recently to have B12 deficiency.  Her heartburn symptoms have been better recently and resolves with apple cider vinegar.  Her insomnia is better, and she feels less anxious and stressed.  She does report that she is taking her CPE soon. [de-identified] : normal [de-identified] : minimal gastritis [de-identified] : focal active colitis on random bx

## 2019-01-03 NOTE — REVIEW OF SYSTEMS
[Nosebleeds] : nosebleeds [Nasal Discharge] : nasal discharge [Abdominal Pain] : abdominal pain [Diarrhea] : diarrhea [Melena] : meldon [Vaginal Discharge] : vaginal discharge [Negative] : Heme/Lymph [As Noted in HPI] : as noted in HPI

## 2019-01-03 NOTE — PHYSICAL EXAM
[General Appearance - Alert] : alert [General Appearance - In No Acute Distress] : in no acute distress [General Appearance - Well Nourished] : well nourished [Sclera] : the sclera and conjunctiva were normal [Extraocular Movements] : extraocular movements were intact [Outer Ear] : the ears and nose were normal in appearance [Hearing Threshold Finger Rub Not Bledsoe] : hearing was normal [Neck Appearance] : the appearance of the neck was normal [Neck Cervical Mass (___cm)] : no neck mass was observed [Auscultation Breath Sounds / Voice Sounds] : lungs were clear to auscultation bilaterally [Heart Rate And Rhythm] : heart rate was normal and rhythm regular [Heart Sounds] : normal S1 and S2 [Edema] : there was no peripheral edema [Bowel Sounds] : normal bowel sounds [Abdomen Soft] : soft [] : no hepato-splenomegaly [Abdomen Mass (___ Cm)] : no abdominal mass palpated [Abdomen Hernia] : no hernia was discovered [Cervical Lymph Nodes Enlarged Anterior Bilaterally] : anterior cervical [Supraclavicular Lymph Nodes Enlarged Bilaterally] : supraclavicular [No CVA Tenderness] : no ~M costovertebral angle tenderness [No Spinal Tenderness] : no spinal tenderness [Abnormal Walk] : normal gait [Skin Color & Pigmentation] : normal skin color and pigmentation [No Focal Deficits] : no focal deficits [Oriented To Time, Place, And Person] : oriented to person, place, and time [FreeTextEntry1] : mild LLQ tenderness

## 2019-01-09 ENCOUNTER — APPOINTMENT (OUTPATIENT)
Dept: PULMONOLOGY | Facility: CLINIC | Age: 29
End: 2019-01-09

## 2019-01-15 ENCOUNTER — APPOINTMENT (OUTPATIENT)
Dept: OTOLARYNGOLOGY | Facility: CLINIC | Age: 29
End: 2019-01-15

## 2019-01-25 ENCOUNTER — OUTPATIENT (OUTPATIENT)
Dept: OUTPATIENT SERVICES | Facility: HOSPITAL | Age: 29
LOS: 1 days | Discharge: HOME | End: 2019-01-25

## 2019-01-25 DIAGNOSIS — Z98.890 OTHER SPECIFIED POSTPROCEDURAL STATES: Chronic | ICD-10-CM

## 2019-01-25 DIAGNOSIS — R79.1 ABNORMAL COAGULATION PROFILE: ICD-10-CM

## 2019-01-28 ENCOUNTER — APPOINTMENT (OUTPATIENT)
Dept: GASTROENTEROLOGY | Facility: CLINIC | Age: 29
End: 2019-01-28

## 2019-01-28 ENCOUNTER — OTHER (OUTPATIENT)
Age: 29
End: 2019-01-28

## 2019-01-28 ENCOUNTER — APPOINTMENT (OUTPATIENT)
Dept: PULMONOLOGY | Facility: CLINIC | Age: 29
End: 2019-01-28
Payer: COMMERCIAL

## 2019-01-28 ENCOUNTER — RESULT REVIEW (OUTPATIENT)
Age: 29
End: 2019-01-28

## 2019-01-28 ENCOUNTER — APPOINTMENT (OUTPATIENT)
Dept: HEMATOLOGY ONCOLOGY | Facility: CLINIC | Age: 29
End: 2019-01-28
Payer: COMMERCIAL

## 2019-01-28 VITALS
RESPIRATION RATE: 14 BRPM | TEMPERATURE: 98.6 F | OXYGEN SATURATION: 100 % | DIASTOLIC BLOOD PRESSURE: 82 MMHG | HEART RATE: 78 BPM | BODY MASS INDEX: 22.18 KG/M2 | WEIGHT: 125.22 LBS | SYSTOLIC BLOOD PRESSURE: 116 MMHG

## 2019-01-28 VITALS
SYSTOLIC BLOOD PRESSURE: 114 MMHG | WEIGHT: 125 LBS | OXYGEN SATURATION: 97 % | BODY MASS INDEX: 22.14 KG/M2 | TEMPERATURE: 98.2 F | DIASTOLIC BLOOD PRESSURE: 72 MMHG | RESPIRATION RATE: 12 BRPM | HEART RATE: 85 BPM

## 2019-01-28 DIAGNOSIS — D68.2 HEREDITARY DEFICIENCY OF OTHER CLOTTING FACTORS: ICD-10-CM

## 2019-01-28 LAB
BASOPHILS # BLD AUTO: 0 K/UL — SIGNIFICANT CHANGE UP (ref 0–0.2)
BASOPHILS NFR BLD AUTO: 0.8 % — SIGNIFICANT CHANGE UP (ref 0–2)
EOSINOPHIL # BLD AUTO: 0.1 K/UL — SIGNIFICANT CHANGE UP (ref 0–0.5)
EOSINOPHIL NFR BLD AUTO: 2.3 % — SIGNIFICANT CHANGE UP (ref 0–6)
HCT VFR BLD CALC: 40.9 % — SIGNIFICANT CHANGE UP (ref 34.5–45)
HGB BLD-MCNC: 13.6 G/DL — SIGNIFICANT CHANGE UP (ref 11.5–15.5)
INR PPP: 1.51 RATIO
LYMPHOCYTES # BLD AUTO: 1.8 K/UL — SIGNIFICANT CHANGE UP (ref 1–3.3)
LYMPHOCYTES # BLD AUTO: 37.4 % — SIGNIFICANT CHANGE UP (ref 13–44)
MCHC RBC-ENTMCNC: 28.8 PG — SIGNIFICANT CHANGE UP (ref 27–34)
MCHC RBC-ENTMCNC: 33.1 G/DL — SIGNIFICANT CHANGE UP (ref 32–36)
MCV RBC AUTO: 87.1 FL — SIGNIFICANT CHANGE UP (ref 80–100)
MONOCYTES # BLD AUTO: 0.2 K/UL — SIGNIFICANT CHANGE UP (ref 0–0.9)
MONOCYTES NFR BLD AUTO: 4.7 % — SIGNIFICANT CHANGE UP (ref 2–14)
NEUTROPHILS # BLD AUTO: 2.7 K/UL — SIGNIFICANT CHANGE UP (ref 1.8–7.4)
NEUTROPHILS NFR BLD AUTO: 54.8 % — SIGNIFICANT CHANGE UP (ref 43–77)
PLATELET # BLD AUTO: 300 K/UL — SIGNIFICANT CHANGE UP (ref 150–400)
PT BLD: 17.1 SEC
RBC # BLD: 4.7 M/UL — SIGNIFICANT CHANGE UP (ref 3.8–5.2)
RBC # FLD: 11.2 % — SIGNIFICANT CHANGE UP (ref 10.3–14.5)
WBC # BLD: 4.9 K/UL — SIGNIFICANT CHANGE UP (ref 3.8–10.5)
WBC # FLD AUTO: 4.9 K/UL — SIGNIFICANT CHANGE UP (ref 3.8–10.5)

## 2019-01-28 PROCEDURE — 94060 EVALUATION OF WHEEZING: CPT

## 2019-01-28 PROCEDURE — 94729 DIFFUSING CAPACITY: CPT

## 2019-01-28 PROCEDURE — 94727 GAS DIL/WSHOT DETER LNG VOL: CPT

## 2019-01-28 PROCEDURE — 99215 OFFICE O/P EST HI 40 MIN: CPT | Mod: 25

## 2019-01-28 PROCEDURE — 99213 OFFICE O/P EST LOW 20 MIN: CPT

## 2019-01-28 RX ORDER — AMOXICILLIN AND CLAVULANATE POTASSIUM 875; 125 MG/1; MG/1
875-125 TABLET, COATED ORAL
Qty: 20 | Refills: 0 | Status: COMPLETED | COMMUNITY
Start: 2019-01-03 | End: 2019-01-28

## 2019-01-28 NOTE — HISTORY OF PRESENT ILLNESS
[de-identified] : Ms. London was referred to my office after a [facility] ED visit on 2/6/17 for epistaxis and elevated PT. The patient has been having nose bleeds for this past month lasting between 10 min to 1 hour, from L nostril. She has had 3 cauterizations by ENT for it, most recently on 2/2/17. She reports no recent trauma to nose. She has had intermittent epistaxis in the past, but not to this extent. No bruising history, no FHx of bleeding (has 1 older brother). She has regular menses, lasting for about 7 days, 3 pads/day, not heavy, no blood clots.  [de-identified] : The patient is here for hematological evaluation prior to LEEP procedure -scheduled for 5/30/18. She had NO bleeding after procedure. \par \par She is here for presurgical clearance for rhinoplasty -scheduled at [facility] on 1/8/19.  She feels well, has no complaints.

## 2019-01-28 NOTE — DISCUSSION/SUMMARY
[FreeTextEntry1] : Dear Dr. Liz,\par \par Ms. London is a 27yo F known to me for a history of high Pt/INR -diagnosed with mild factor VII deficiency. \par She was last seen in my office on 5/7/18 for hematological clearance for LEEP, which is scheduled for 5/30/18. Since last visit, patient has had a colonoscopy with polypectomy without any bleeding complications. She denied any nose bleeds/spontaneous bruising, and has had no melena/BRBPR.\par \par Labs done on 5/7/18 showed PT/INR of 16.1sec/1.41, with a factor VII level of 25% c/w pt's history (likely she has a heterozygous mutation)\par \par PLAN\par \par Given patient's factor VII level is above 20%, and given that she has not had bleeding post-invasive procedures, she is hematologically cleared for LEEP procedure without any prophylactic treatment.\par \par Should the patient experience heavy post-procedural bleeding, she should be admitted for administration of recombinant factor VII (NovaSeven) -15micrograms/kg q12 hours with goal of normalizing PT/INR (factor VII level >=50%).\par \par Should you have any questions, please feel free to contact my office at .\par \par Sincerely,\par Karlene Fregoso MD\par

## 2019-01-28 NOTE — ASSESSMENT
[FreeTextEntry1] : 29 yo F with no significant PMHx here for hematological clearance prior to LEEP procedure. She has a low factor VII level -but no clinical bleeding with it\par \par -repeat Pt/INR and factor VII level today and depending on level will make recommendations. DDAVP IVSS discussed with pt -however, she may not need it since she has not had clinically significant bleeding after invasive procedures\par \par -follow up with patient as needed\par

## 2019-01-28 NOTE — RESULTS/DATA
[FreeTextEntry1] : Today's CBC (On 1/28/19) wbc 4.9 Hb 13.6 plt 300\par \par On 5/7/18) wbc 4.5 Hb 14.6 plt 278\par On 6/26/17) wbc 6 ANC 3800 Hb 14.9 plt 283\par On 5/24/17) wbc 4.9 ANC 2200 Hb 12 plt 263\par On 3/6/17) wbc 5 Hb 13.9 plt 242 ANC 2800\par On 2/8/17) wbc 5.4 ANC 3400 ALC 1400 AMoC 400 Eo 200 Ba 0 Hb 13.7 plt 275\par On 2/6/17 wbc 5.8 normal differential hb 14 plt 281. PT 16.4 (nl <13.1)/INR 1.43 aPTT 35.1. CMP normal LFT's normal\par [facility] 1/24/17 wbc 7.2 nl differential Hb 14.1 plt 275 PT 16.2 aPTT 35.3\par \par

## 2019-01-28 NOTE — REVIEW OF SYSTEMS
[Fever] : no fever [Chills] : no chills [Night Sweats] : no night sweats [Fatigue] : no fatigue [Recent Change In Weight] : ~T no recent weight change [Dry Eyes] : no dryness of the eyes [Nosebleeds] : no nosebleeds [Abdominal Pain] : no abdominal pain [Vomiting] : no vomiting [Constipation] : no constipation [Diarrhea] : no diarrhea [Vaginal Discharge] : no vaginal discharge [Dysmenorrhea/Abn Vaginal Bleeding] : no dysmenorrhea/abnormal vaginal bleeding [Joint Pain] : no joint pain [Skin Rash] : no skin rash [Easy Bleeding] : no tendency for easy bleeding [Easy Bruising] : no tendency for easy bruising [Swollen Glands] : no swollen glands [Negative] : Allergic/Immunologic

## 2019-01-29 LAB — FACT VII ACT/NOR PPP: 22 %

## 2019-01-29 NOTE — PHYSICAL EXAM
[Neck Appearance] : the appearance of the neck was normal [Heart Rate And Rhythm] : heart rate and rhythm were normal [Heart Sounds] : normal S1 and S2 [Exaggerated Use Of Accessory Muscles For Inspiration] : no accessory muscle use [Auscultation Breath Sounds / Voice Sounds] : lungs were clear to auscultation bilaterally [Bowel Sounds] : normal bowel sounds [Abdomen Soft] : soft [Abnormal Walk] : normal gait [Nail Clubbing] : no clubbing of the fingernails [Cyanosis, Localized] : no localized cyanosis [Oriented To Time, Place, And Person] : oriented to person, place, and time [Impaired Insight] : insight and judgment were intact [General Appearance - In No Acute Distress] : no acute distress [] : no rash [No Focal Deficits] : no focal deficits [Erythema] : no erythema of the pharynx

## 2019-01-29 NOTE — HISTORY OF PRESENT ILLNESS
[FreeTextEntry1] : She came for a follow up today. No wheezing or shortness of breath today. Had a "cold" in December. Has been coughing since then. The cough is dry. Last week had some blood from her nose. No fever or chills. No excessive day time somnolence. \par \par She never smoked. Does not have any pets. No occupational exposures evident.

## 2019-01-29 NOTE — REVIEW OF SYSTEMS
[Sinus Problems] : sinus problems [Hay Fever] : hay fever [Difficulty Maintaining Sleep] : difficulty maintaining sleep [Nonrestorative Sleep] : nonrestorative sleep [Hypersomnolence] : sleeping much more than usual [Cough] : cough [Clotting Disorder] : clotting disorder [Fever] : no fever [Chills] : no chills [Postnasal Drip] : no postnasal drip [Sputum] : not coughing up ~M sputum [Hemoptysis] : no hemoptysis [Dyspnea] : no dyspnea [Chest Tightness] : no chest tightness [Wheezing] : no wheezing [Hypertension] : no ~T hypertension [PND] : no PND [Edema] : ~T edema was not present [Back Pain] : ~T no back pain [Myalgias] : no myalgias [Blood Transfusion] : no blood transfusion [Headache] : no headache [Anxiety] : no anxiety [Diabetes] : no diabetes mellitus [Thyroid Problem] : no thyroid problem [Difficulty Initiating Sleep] : no difficulty falling asleep [Unusual Movements] : no involuntary movements during sleep [Snoring] : no snoring [Witnessed Apneas] : demonstrated no ~M apnea

## 2019-01-29 NOTE — DISCUSSION/SUMMARY
[FreeTextEntry1] : 29 year old woman with mild reactive airways disease. \par \par Her asthma is not active at this time.  Has a mild cough following a recent URI. Advised to continue with montelukast and Ventolin as needed. \par \par Follow up in six months.

## 2019-03-11 ENCOUNTER — APPOINTMENT (OUTPATIENT)
Dept: OBGYN | Facility: CLINIC | Age: 29
End: 2019-03-11
Payer: COMMERCIAL

## 2019-03-11 PROCEDURE — 99395 PREV VISIT EST AGE 18-39: CPT

## 2019-04-16 NOTE — H&P PST ADULT - RESPIRATORY AND THORAX
Med refill request: losartan 50 mg tab   2 tabs po daily    Last refilled: 1/7/19 #60 and 11 RF    Last seen: 12/12/18  Next appointment: 12/16/19    Pharmacy stated that they did receive the prescription, but \"for some reason it was closed out\". They are requesting a new prescription.    Med refilled.            
Patient states that the pharmacy told him they don't have current script for losartan (COZAAR) 50 MG tablet.  Explained there were 11 refills provided in January, but pharmacy told him they don't have any refills for him.      Pharmacy:  [facility]  
Writer attempted to contact pt. A message was left to inform pt that med was resent due to the prescription being \"closed out\".  
negative

## 2019-05-17 ENCOUNTER — APPOINTMENT (OUTPATIENT)
Dept: OBGYN | Facility: CLINIC | Age: 29
End: 2019-05-17
Payer: COMMERCIAL

## 2019-05-17 PROCEDURE — 99213 OFFICE O/P EST LOW 20 MIN: CPT

## 2019-05-28 ENCOUNTER — APPOINTMENT (OUTPATIENT)
Dept: HUMAN REPRODUCTION | Facility: CLINIC | Age: 29
End: 2019-05-28
Payer: COMMERCIAL

## 2019-05-28 PROCEDURE — 99205 OFFICE O/P NEW HI 60 MIN: CPT

## 2019-05-28 PROCEDURE — 36415 COLL VENOUS BLD VENIPUNCTURE: CPT

## 2019-05-28 PROCEDURE — 76830 TRANSVAGINAL US NON-OB: CPT

## 2019-06-07 ENCOUNTER — APPOINTMENT (OUTPATIENT)
Dept: HUMAN REPRODUCTION | Facility: CLINIC | Age: 29
End: 2019-06-07
Payer: COMMERCIAL

## 2019-06-07 PROCEDURE — 76831 ECHO EXAM UTERUS: CPT

## 2019-06-07 PROCEDURE — 76830 TRANSVAGINAL US NON-OB: CPT | Mod: 59

## 2019-06-12 ENCOUNTER — APPOINTMENT (OUTPATIENT)
Dept: GASTROENTEROLOGY | Facility: CLINIC | Age: 29
End: 2019-06-12

## 2019-06-23 ENCOUNTER — TRANSCRIPTION ENCOUNTER (OUTPATIENT)
Age: 29
End: 2019-06-23

## 2019-06-25 ENCOUNTER — APPOINTMENT (OUTPATIENT)
Dept: HUMAN REPRODUCTION | Facility: CLINIC | Age: 29
End: 2019-06-25
Payer: COMMERCIAL

## 2019-06-25 PROCEDURE — 99215 OFFICE O/P EST HI 40 MIN: CPT

## 2019-07-19 ENCOUNTER — APPOINTMENT (OUTPATIENT)
Dept: GASTROENTEROLOGY | Facility: CLINIC | Age: 29
End: 2019-07-19
Payer: COMMERCIAL

## 2019-07-19 VITALS
HEIGHT: 63 IN | WEIGHT: 125 LBS | DIASTOLIC BLOOD PRESSURE: 79 MMHG | BODY MASS INDEX: 22.15 KG/M2 | SYSTOLIC BLOOD PRESSURE: 103 MMHG | HEART RATE: 99 BPM

## 2019-07-19 PROCEDURE — 99214 OFFICE O/P EST MOD 30 MIN: CPT

## 2019-07-19 RX ORDER — MONTELUKAST 10 MG/1
10 TABLET, FILM COATED ORAL
Qty: 30 | Refills: 2 | Status: DISCONTINUED | COMMUNITY
End: 2019-07-19

## 2019-07-19 RX ORDER — ALBUTEROL SULFATE 90 UG/1
108 (90 BASE) AEROSOL, METERED RESPIRATORY (INHALATION) EVERY 6 HOURS
Qty: 1 | Refills: 2 | Status: DISCONTINUED | COMMUNITY
Start: 2019-01-28 | End: 2019-07-19

## 2019-07-19 NOTE — HISTORY OF PRESENT ILLNESS
[Nausea] : denies nausea [Vomiting] : denies vomiting [Constipation] : denies constipation [Yellow Skin Or Eyes (Jaundice)] : denies jaundice [Abdominal Pain] : abdominal pain worsened [Abdominal Swelling] : denies abdominal swelling [Rectal Pain] : denies rectal pain [_________] : Performed [unfilled] [Heartburn] : heartburn worsened [Diarrhea] : diarrhea worsened [de-identified] : Angel presents to the office today for follow up with complaints of abdominal cramps and loose stools.  She was last seen in the office in January 2019.\par \par Since her last visit, she had been feeling well until she had a rhinoplasty in February 2019.  After the surgery, she used Percocet, and developed constipation for 2-3 weeks.  She started taking a stool softener and her lower GI symptoms improved.  However, she had a sore throat after the surgery and went to see an ENT doctor on June 5th.  He performed a fiberoptic exam and told her it was like irritation from the endotracheal tube.  When she developed worsening throat pain with fever the next day, he prescribed her amoxicillin (without clavulanate) for one week.  After that, she developed nonbloody diarrhea which has not resolved in the past month.  With the diarrhea, she has also lost 5 pounds.  She has abdominal cramps followed by 3-4 episodes of diarrhea.  Drinking Pedialyte has helped slow down the diarrhea.  She has seen streaks of blood with her stool.  She has been concerned as she recently saw bugs on the chocolates she was eating at work.  Of note, she had seen a second ENT for the throat pain who prescribed her pantoprazole 40 mg.  While it was helping, she developed joint pains so she stopped it. [de-identified] : normal [de-identified] : minimal gastritis [de-identified] : focal active colitis on random bx

## 2019-07-19 NOTE — PHYSICAL EXAM
[General Appearance - Alert] : alert [General Appearance - In No Acute Distress] : in no acute distress [General Appearance - Well Nourished] : well nourished [Sclera] : the sclera and conjunctiva were normal [Extraocular Movements] : extraocular movements were intact [Outer Ear] : the ears and nose were normal in appearance [Hearing Threshold Finger Rub Not [place]] : hearing was normal [Neck Appearance] : the appearance of the neck was normal [Neck Cervical Mass (___cm)] : no neck mass was observed [Auscultation Breath Sounds / Voice Sounds] : lungs were clear to auscultation bilaterally [Heart Rate And Rhythm] : heart rate was normal and rhythm regular [Heart Sounds] : normal S1 and S2 [Edema] : there was no peripheral edema [Bowel Sounds] : normal bowel sounds [Abdomen Soft] : soft [] : no hepato-splenomegaly [Abdomen Mass (___ Cm)] : no abdominal mass palpated [Abdomen Hernia] : no hernia was discovered [Cervical Lymph Nodes Enlarged Anterior Bilaterally] : anterior cervical [Supraclavicular Lymph Nodes Enlarged Bilaterally] : supraclavicular [No CVA Tenderness] : no ~M costovertebral angle tenderness [No Spinal Tenderness] : no spinal tenderness [Abnormal Walk] : normal gait [Skin Color & Pigmentation] : normal skin color and pigmentation [No Focal Deficits] : no focal deficits [Oriented To Time, Place, And Person] : oriented to person, place, and time [FreeTextEntry1] : mild epigastric tenderness [Skin Turgor] : normal skin turgor

## 2019-07-19 NOTE — ASSESSMENT
[FreeTextEntry1] : 1.  Diarrhea.  May be antiobiotic induced (amoxicillin) vs functional diarrhea/IBS.  Rule out infectious etiology.  SIBO possible but less likely given antibiotic use (B12 deficiency, previous positive breath test, response to antibiotics).  Colonoscopy on March 10, 2017 with focally active colitis in descending colon, but otherwise unremarkable.  CT scan in February 2017 with moderate stool in colon, no inflammation. CT scan in June 2017 unremarkable.\par 2.  GERD with esophagitis, stable.  Upper endoscopy in February 16, 2015 with esophagitis and erosive gastritis. \par 3.  Abnormal hydrogen breath testing suggesting small intestinal bacterial overgrowth (vs rapid transit), status post improvement on Xifaxan and Augmentin.\par 4.  Weight loss, improved. \par 5.  History of epistaxis with Factor VII deficiency. \par 6.  Anxiety.\par 7.  Insomnia.\par 8.  CIN1.\par  \par Plan:\par - Check infectious stool workup.\par - Trial of Librax 1-3 times a day.\par - If diarrhea persists for more than one week, can consider treatment for SIBO.\par - Given prior endoscopic and CT tests, patient was advised that her symptoms are less likely to be caused by IBD, especially if diarrhea resolves.

## 2019-07-23 LAB
BACTERIA STL CULT: NORMAL
G LAMBLIA AG STL QL: NORMAL

## 2019-07-23 RX ORDER — METRONIDAZOLE 500 MG/1
500 TABLET ORAL
Qty: 30 | Refills: 0 | Status: COMPLETED | COMMUNITY
Start: 2019-07-23 | End: 2019-08-02

## 2019-07-23 RX ORDER — METRONIDAZOLE 250 MG/1
250 TABLET ORAL EVERY 8 HOURS
Qty: 30 | Refills: 0 | Status: DISCONTINUED | COMMUNITY
Start: 2019-07-23 | End: 2019-07-23

## 2019-07-24 LAB — DEPRECATED O AND P PREP STL: NORMAL

## 2019-07-25 ENCOUNTER — APPOINTMENT (OUTPATIENT)
Dept: OTOLARYNGOLOGY | Facility: CLINIC | Age: 29
End: 2019-07-25

## 2019-07-30 DIAGNOSIS — R11.0 NAUSEA: ICD-10-CM

## 2019-07-30 RX ORDER — CHLORDIAZEPOXIDE HYDROCHLORIDE AND CLIDINIUM BROMIDE 5; 2.5 MG/1; MG/1
5-2.5 CAPSULE, GELATIN COATED ORAL
Qty: 30 | Refills: 0 | Status: DISCONTINUED | COMMUNITY
Start: 2019-07-19 | End: 2019-07-30

## 2019-08-05 ENCOUNTER — TRANSCRIPTION ENCOUNTER (OUTPATIENT)
Age: 29
End: 2019-08-05

## 2019-08-06 ENCOUNTER — TRANSCRIPTION ENCOUNTER (OUTPATIENT)
Age: 29
End: 2019-08-06

## 2019-08-09 ENCOUNTER — TRANSCRIPTION ENCOUNTER (OUTPATIENT)
Age: 29
End: 2019-08-09

## 2019-09-06 ENCOUNTER — APPOINTMENT (OUTPATIENT)
Dept: GASTROENTEROLOGY | Facility: CLINIC | Age: 29
End: 2019-09-06
Payer: COMMERCIAL

## 2019-09-06 VITALS
BODY MASS INDEX: 21.26 KG/M2 | SYSTOLIC BLOOD PRESSURE: 102 MMHG | HEIGHT: 63 IN | HEART RATE: 96 BPM | WEIGHT: 120 LBS | DIASTOLIC BLOOD PRESSURE: 72 MMHG

## 2019-09-06 DIAGNOSIS — K21.9 GASTRO-ESOPHAGEAL REFLUX DISEASE W/OUT ESOPHAGITIS: ICD-10-CM

## 2019-09-06 DIAGNOSIS — K58.2 MIXED IRRITABLE BOWEL SYNDROME: ICD-10-CM

## 2019-09-06 PROCEDURE — 99214 OFFICE O/P EST MOD 30 MIN: CPT

## 2019-09-06 RX ORDER — ONDANSETRON 4 MG/1
4 TABLET ORAL
Qty: 15 | Refills: 0 | Status: DISCONTINUED | COMMUNITY
Start: 2019-07-30 | End: 2019-09-06

## 2019-09-06 NOTE — REVIEW OF SYSTEMS
[Feeling Poorly] : feeling poorly [Feeling Tired] : feeling tired [Recent Weight Loss (___ Lbs)] : recent [unfilled] ~Ulb weight loss [Abdominal Pain] : abdominal pain [Sore Throat] : sore throat [Constipation] : constipation [Diarrhea] : diarrhea [Heartburn] : heartburn [Melena] : meldon [Sleep Disturbances] : sleep disturbances [Negative] : Heme/Lymph [As Noted in HPI] : as noted in HPI

## 2019-09-06 NOTE — HISTORY OF PRESENT ILLNESS
[Heartburn] : heartburn worsened [Vomiting] : denies vomiting [Nausea] : denies nausea [Diarrhea] : diarrhea worsened [Constipation] : denies constipation [Yellow Skin Or Eyes (Jaundice)] : denies jaundice [Abdominal Pain] : abdominal pain worsened [Abdominal Swelling] : denies abdominal swelling [Rectal Pain] : denies rectal pain [_________] : Performed [unfilled] [de-identified] : Angel presents to the office today for follow up with complaints of reflux, sore throat, and irregular bowel habits.  She was last seen in the office in July 2019 for diarrhea.\par \par Since her last visit, she reports that she has had constipation after using metronidazole and Imodium.  She took Imodium for two days and started having scybalous stools.  Last week she went back to her nutritionist who placed her on a diet and had her take a 15 strain probiotic.  Her stools were normal while on the probiotic but two days ago she started having a burning sensation in her epigastric region.  She also reports a sore throat.  She started taking Zantac but feels that it may be causing insomnia.  She is worried that she may have an ulcer or Mendoza's.  She reports that her fiance has also experienced diarrhea. [de-identified] : focal active colitis on random bx [de-identified] : normal [de-identified] : minimal gastritis

## 2019-09-06 NOTE — REASON FOR VISIT
[Follow-Up: _____] : a [unfilled] follow-up visit [FreeTextEntry1] : severe acid reflux, weight loss, stomach discomfort

## 2019-09-06 NOTE — PHYSICAL EXAM
[General Appearance - Alert] : alert [General Appearance - Well Nourished] : well nourished [General Appearance - In No Acute Distress] : in no acute distress [Sclera] : the sclera and conjunctiva were normal [Extraocular Movements] : extraocular movements were intact [Hearing Threshold Finger Rub Not [place]] : hearing was normal [Outer Ear] : the ears and nose were normal in appearance [Neck Appearance] : the appearance of the neck was normal [Neck Cervical Mass (___cm)] : no neck mass was observed [Auscultation Breath Sounds / Voice Sounds] : lungs were clear to auscultation bilaterally [Heart Rate And Rhythm] : heart rate was normal and rhythm regular [Heart Sounds] : normal S1 and S2 [Bowel Sounds] : normal bowel sounds [Edema] : there was no peripheral edema [Abdomen Soft] : soft [Abdomen Mass (___ Cm)] : no abdominal mass palpated [] : no hepato-splenomegaly [FreeTextEntry1] : mild epigastric tenderness [Abdomen Hernia] : no hernia was discovered [Cervical Lymph Nodes Enlarged Anterior Bilaterally] : anterior cervical [Supraclavicular Lymph Nodes Enlarged Bilaterally] : supraclavicular [No CVA Tenderness] : no ~M costovertebral angle tenderness [Abnormal Walk] : normal gait [No Spinal Tenderness] : no spinal tenderness [Skin Turgor] : normal skin turgor [Skin Color & Pigmentation] : normal skin color and pigmentation [No Focal Deficits] : no focal deficits [Oriented To Time, Place, And Person] : oriented to person, place, and time

## 2019-09-06 NOTE — ASSESSMENT
[FreeTextEntry1] : 1.  GERD with esophagitis, stable.  Upper endoscopy in February 16, 2015 with esophagitis and erosive gastritis. \par 2.  IBS-mixed type and SIBO.  Colonoscopy on March 10, 2017 with focally active colitis in descending colon, but otherwise unremarkable.  CT scan in February 2017 with moderate stool in colon, no inflammation. CT scan in June 2017 unremarkable.\par 3.  Abnormal hydrogen breath testing suggesting small intestinal bacterial overgrowth (vs rapid transit), status post improvement on Xifaxan and Augmentin.\par 4.  Weight loss.\par 5.  History of epistaxis with Factor VII deficiency. \par 6.  Anxiety.\par 7.  Insomnia.\par 8.  CIN1.\par  \par Plan:\par - The patient was advised to use Zantac as needed for 1-2 weeks.  She can also use an antacid such as Gaviscon if experiencing insomnia.\par - Small frequent meals, avoid eating 2-3 hours before bedtime.\par - Patient was reassured that in absence of red flags, EGD does not need to be repeated at this time.

## 2019-10-23 ENCOUNTER — APPOINTMENT (OUTPATIENT)
Dept: GASTROENTEROLOGY | Facility: CLINIC | Age: 29
End: 2019-10-23

## 2019-11-04 ENCOUNTER — APPOINTMENT (OUTPATIENT)
Dept: OBGYN | Facility: CLINIC | Age: 29
End: 2019-11-04

## 2019-11-25 ENCOUNTER — APPOINTMENT (OUTPATIENT)
Dept: OBGYN | Facility: CLINIC | Age: 29
End: 2019-11-25

## 2019-12-18 ENCOUNTER — APPOINTMENT (OUTPATIENT)
Dept: GASTROENTEROLOGY | Facility: CLINIC | Age: 29
End: 2019-12-18

## 2020-01-08 ENCOUNTER — APPOINTMENT (OUTPATIENT)
Dept: GASTROENTEROLOGY | Facility: CLINIC | Age: 30
End: 2020-01-08

## 2020-03-17 ENCOUNTER — APPOINTMENT (OUTPATIENT)
Dept: OBGYN | Facility: CLINIC | Age: 30
End: 2020-03-17

## 2020-03-24 ENCOUNTER — APPOINTMENT (OUTPATIENT)
Dept: HUMAN REPRODUCTION | Facility: CLINIC | Age: 30
End: 2020-03-24
Payer: COMMERCIAL

## 2020-03-24 PROCEDURE — 99423 OL DIG E/M SVC 21+ MIN: CPT

## 2020-05-05 ENCOUNTER — APPOINTMENT (OUTPATIENT)
Dept: OBGYN | Facility: CLINIC | Age: 30
End: 2020-05-05

## 2020-05-12 ENCOUNTER — RESULT REVIEW (OUTPATIENT)
Age: 30
End: 2020-05-12

## 2020-05-12 ENCOUNTER — APPOINTMENT (OUTPATIENT)
Dept: OBGYN | Facility: CLINIC | Age: 30
End: 2020-05-12
Payer: COMMERCIAL

## 2020-05-12 PROCEDURE — 99395 PREV VISIT EST AGE 18-39: CPT

## 2020-06-10 NOTE — ED ADULT NURSE NOTE - NS ED NOTE ABUSE RESPONSE YN
Health Maintenance Due   Topic Date Due   • Shingles Vaccine (1 of 2) 03/02/2017   • Breast Cancer Screening  11/15/2019       Patient is due for topics listed above, she wishes to proceed with Immunization(s) Shingles. Had Mammo today.             no

## 2020-07-20 ENCOUNTER — APPOINTMENT (OUTPATIENT)
Dept: OBGYN | Facility: CLINIC | Age: 30
End: 2020-07-20

## 2020-08-14 ENCOUNTER — APPOINTMENT (OUTPATIENT)
Dept: OBGYN | Facility: CLINIC | Age: 30
End: 2020-08-14

## 2020-08-17 ENCOUNTER — APPOINTMENT (OUTPATIENT)
Dept: HUMAN REPRODUCTION | Facility: CLINIC | Age: 30
End: 2020-08-17

## 2020-09-01 ENCOUNTER — APPOINTMENT (OUTPATIENT)
Dept: OBGYN | Facility: CLINIC | Age: 30
End: 2020-09-01
Payer: COMMERCIAL

## 2020-09-01 PROCEDURE — 99213 OFFICE O/P EST LOW 20 MIN: CPT

## 2020-09-15 ENCOUNTER — APPOINTMENT (OUTPATIENT)
Dept: HUMAN REPRODUCTION | Facility: CLINIC | Age: 30
End: 2020-09-15
Payer: COMMERCIAL

## 2020-09-15 PROCEDURE — 99213 OFFICE O/P EST LOW 20 MIN: CPT | Mod: 95

## 2020-09-16 ENCOUNTER — APPOINTMENT (OUTPATIENT)
Dept: HUMAN REPRODUCTION | Facility: CLINIC | Age: 30
End: 2020-09-16
Payer: COMMERCIAL

## 2020-09-16 PROCEDURE — 76831 ECHO EXAM UTERUS: CPT

## 2020-09-16 PROCEDURE — 58340 CATHETER FOR HYSTEROGRAPHY: CPT

## 2020-09-16 PROCEDURE — 99213 OFFICE O/P EST LOW 20 MIN: CPT | Mod: 25

## 2020-09-30 ENCOUNTER — APPOINTMENT (OUTPATIENT)
Dept: HUMAN REPRODUCTION | Facility: CLINIC | Age: 30
End: 2020-09-30
Payer: COMMERCIAL

## 2020-09-30 PROCEDURE — 76830 TRANSVAGINAL US NON-OB: CPT

## 2020-09-30 PROCEDURE — 99213 OFFICE O/P EST LOW 20 MIN: CPT | Mod: 25

## 2020-09-30 PROCEDURE — 36415 COLL VENOUS BLD VENIPUNCTURE: CPT

## 2020-11-28 ENCOUNTER — EMERGENCY (EMERGENCY)
Facility: HOSPITAL | Age: 30
LOS: 0 days | Discharge: HOME | End: 2020-11-28
Attending: EMERGENCY MEDICINE
Payer: COMMERCIAL

## 2020-11-28 VITALS
RESPIRATION RATE: 18 BRPM | HEART RATE: 95 BPM | DIASTOLIC BLOOD PRESSURE: 77 MMHG | TEMPERATURE: 99 F | SYSTOLIC BLOOD PRESSURE: 115 MMHG | OXYGEN SATURATION: 100 %

## 2020-11-28 VITALS
OXYGEN SATURATION: 100 % | HEART RATE: 120 BPM | RESPIRATION RATE: 18 BRPM | TEMPERATURE: 99 F | DIASTOLIC BLOOD PRESSURE: 90 MMHG | SYSTOLIC BLOOD PRESSURE: 151 MMHG

## 2020-11-28 DIAGNOSIS — K64.8 OTHER HEMORRHOIDS: ICD-10-CM

## 2020-11-28 DIAGNOSIS — D68.2 HEREDITARY DEFICIENCY OF OTHER CLOTTING FACTORS: ICD-10-CM

## 2020-11-28 DIAGNOSIS — R31.9 HEMATURIA, UNSPECIFIED: ICD-10-CM

## 2020-11-28 DIAGNOSIS — N30.01 ACUTE CYSTITIS WITH HEMATURIA: ICD-10-CM

## 2020-11-28 DIAGNOSIS — K58.9 IRRITABLE BOWEL SYNDROME WITHOUT DIARRHEA: ICD-10-CM

## 2020-11-28 DIAGNOSIS — K21.9 GASTRO-ESOPHAGEAL REFLUX DISEASE WITHOUT ESOPHAGITIS: ICD-10-CM

## 2020-11-28 DIAGNOSIS — Z98.890 OTHER SPECIFIED POSTPROCEDURAL STATES: Chronic | ICD-10-CM

## 2020-11-28 DIAGNOSIS — N83.201 UNSPECIFIED OVARIAN CYST, RIGHT SIDE: ICD-10-CM

## 2020-11-28 DIAGNOSIS — K64.4 RESIDUAL HEMORRHOIDAL SKIN TAGS: ICD-10-CM

## 2020-11-28 DIAGNOSIS — R30.0 DYSURIA: ICD-10-CM

## 2020-11-28 DIAGNOSIS — F32.9 MAJOR DEPRESSIVE DISORDER, SINGLE EPISODE, UNSPECIFIED: ICD-10-CM

## 2020-11-28 LAB
ALBUMIN SERPL ELPH-MCNC: 4 G/DL — SIGNIFICANT CHANGE UP (ref 3.3–5)
ALP SERPL-CCNC: 62 U/L — SIGNIFICANT CHANGE UP (ref 40–120)
ALT FLD-CCNC: 17 U/L — SIGNIFICANT CHANGE UP (ref 12–78)
ANION GAP SERPL CALC-SCNC: 7 MMOL/L — SIGNIFICANT CHANGE UP (ref 5–17)
APPEARANCE UR: ABNORMAL
AST SERPL-CCNC: 14 U/L — LOW (ref 15–37)
BASOPHILS # BLD AUTO: 0.02 K/UL — SIGNIFICANT CHANGE UP (ref 0–0.2)
BASOPHILS NFR BLD AUTO: 0.2 % — SIGNIFICANT CHANGE UP (ref 0–2)
BILIRUB SERPL-MCNC: 0.3 MG/DL — SIGNIFICANT CHANGE UP (ref 0.2–1.2)
BILIRUB UR-MCNC: NEGATIVE — SIGNIFICANT CHANGE UP
BUN SERPL-MCNC: 10 MG/DL — SIGNIFICANT CHANGE UP (ref 7–23)
CALCIUM SERPL-MCNC: 9.3 MG/DL — SIGNIFICANT CHANGE UP (ref 8.5–10.1)
CHLORIDE SERPL-SCNC: 107 MMOL/L — SIGNIFICANT CHANGE UP (ref 96–108)
CO2 SERPL-SCNC: 25 MMOL/L — SIGNIFICANT CHANGE UP (ref 22–31)
COLOR SPEC: SIGNIFICANT CHANGE UP
CREAT SERPL-MCNC: 0.67 MG/DL — SIGNIFICANT CHANGE UP (ref 0.5–1.3)
DIFF PNL FLD: ABNORMAL
EOSINOPHIL # BLD AUTO: 0.04 K/UL — SIGNIFICANT CHANGE UP (ref 0–0.5)
EOSINOPHIL NFR BLD AUTO: 0.4 % — SIGNIFICANT CHANGE UP (ref 0–6)
GLUCOSE SERPL-MCNC: 152 MG/DL — HIGH (ref 70–99)
GLUCOSE UR QL: NEGATIVE MG/DL — SIGNIFICANT CHANGE UP
HCT VFR BLD CALC: 41.3 % — SIGNIFICANT CHANGE UP (ref 34.5–45)
HGB BLD-MCNC: 13.4 G/DL — SIGNIFICANT CHANGE UP (ref 11.5–15.5)
IMM GRANULOCYTES NFR BLD AUTO: 0.3 % — SIGNIFICANT CHANGE UP (ref 0–1.5)
KETONES UR-MCNC: ABNORMAL
LEUKOCYTE ESTERASE UR-ACNC: ABNORMAL
LIDOCAIN IGE QN: 76 U/L — SIGNIFICANT CHANGE UP (ref 73–393)
LYMPHOCYTES # BLD AUTO: 1.27 K/UL — SIGNIFICANT CHANGE UP (ref 1–3.3)
LYMPHOCYTES # BLD AUTO: 12.9 % — LOW (ref 13–44)
MCHC RBC-ENTMCNC: 28.1 PG — SIGNIFICANT CHANGE UP (ref 27–34)
MCHC RBC-ENTMCNC: 32.4 GM/DL — SIGNIFICANT CHANGE UP (ref 32–36)
MCV RBC AUTO: 86.6 FL — SIGNIFICANT CHANGE UP (ref 80–100)
MONOCYTES # BLD AUTO: 0.43 K/UL — SIGNIFICANT CHANGE UP (ref 0–0.9)
MONOCYTES NFR BLD AUTO: 4.4 % — SIGNIFICANT CHANGE UP (ref 2–14)
NEUTROPHILS # BLD AUTO: 8.08 K/UL — HIGH (ref 1.8–7.4)
NEUTROPHILS NFR BLD AUTO: 81.8 % — HIGH (ref 43–77)
NITRITE UR-MCNC: POSITIVE
PH UR: 6 — SIGNIFICANT CHANGE UP (ref 5–8)
PLATELET # BLD AUTO: 307 K/UL — SIGNIFICANT CHANGE UP (ref 150–400)
POTASSIUM SERPL-MCNC: 3.6 MMOL/L — SIGNIFICANT CHANGE UP (ref 3.5–5.3)
POTASSIUM SERPL-SCNC: 3.6 MMOL/L — SIGNIFICANT CHANGE UP (ref 3.5–5.3)
PROT SERPL-MCNC: 8.1 GM/DL — SIGNIFICANT CHANGE UP (ref 6–8.3)
PROT UR-MCNC: 15 MG/DL
RBC # BLD: 4.77 M/UL — SIGNIFICANT CHANGE UP (ref 3.8–5.2)
RBC # FLD: 12.2 % — SIGNIFICANT CHANGE UP (ref 10.3–14.5)
SODIUM SERPL-SCNC: 139 MMOL/L — SIGNIFICANT CHANGE UP (ref 135–145)
SP GR SPEC: 1.01 — SIGNIFICANT CHANGE UP (ref 1.01–1.02)
UROBILINOGEN FLD QL: NEGATIVE MG/DL — SIGNIFICANT CHANGE UP
WBC # BLD: 9.87 K/UL — SIGNIFICANT CHANGE UP (ref 3.8–10.5)
WBC # FLD AUTO: 9.87 K/UL — SIGNIFICANT CHANGE UP (ref 3.8–10.5)

## 2020-11-28 PROCEDURE — 87086 URINE CULTURE/COLONY COUNT: CPT

## 2020-11-28 PROCEDURE — 74177 CT ABD & PELVIS W/CONTRAST: CPT

## 2020-11-28 PROCEDURE — 85025 COMPLETE CBC W/AUTO DIFF WBC: CPT

## 2020-11-28 PROCEDURE — 36415 COLL VENOUS BLD VENIPUNCTURE: CPT

## 2020-11-28 PROCEDURE — 81001 URINALYSIS AUTO W/SCOPE: CPT

## 2020-11-28 PROCEDURE — 83690 ASSAY OF LIPASE: CPT

## 2020-11-28 PROCEDURE — 87186 SC STD MICRODIL/AGAR DIL: CPT

## 2020-11-28 PROCEDURE — 99283 EMERGENCY DEPT VISIT LOW MDM: CPT

## 2020-11-28 PROCEDURE — 74177 CT ABD & PELVIS W/CONTRAST: CPT | Mod: 26

## 2020-11-28 PROCEDURE — 80053 COMPREHEN METABOLIC PANEL: CPT

## 2020-11-28 PROCEDURE — 99284 EMERGENCY DEPT VISIT MOD MDM: CPT | Mod: 25

## 2020-11-28 RX ORDER — CEFTRIAXONE 500 MG/1
1000 INJECTION, POWDER, FOR SOLUTION INTRAMUSCULAR; INTRAVENOUS ONCE
Refills: 0 | Status: DISCONTINUED | OUTPATIENT
Start: 2020-11-28 | End: 2020-11-28

## 2020-11-28 RX ORDER — NITROFURANTOIN MACROCRYSTAL 50 MG
1 CAPSULE ORAL
Qty: 14 | Refills: 0
Start: 2020-11-28 | End: 2020-12-04

## 2020-11-28 RX ORDER — NITROFURANTOIN MACROCRYSTAL 50 MG
100 CAPSULE ORAL ONCE
Refills: 0 | Status: COMPLETED | OUTPATIENT
Start: 2020-11-28 | End: 2020-11-28

## 2020-11-28 NOTE — ED STATDOCS - NSFOLLOWUPINSTRUCTIONS_ED_ALL_ED_FT
Plenty of fluids  You can continue your cranberry supplement  Macrobid for antibiotics  Any worsening symptoms including fever, nausea/vomiting, return to ER immediately    Urinary Tract Infection, Adult  ImageA urinary tract infection (UTI) is an infection of any part of the urinary tract, which includes the kidneys, ureters, bladder, and urethra. These organs make, store, and get rid of urine in the body. UTI can be a bladder infection (cystitis) or kidney infection (pyelonephritis).    What are the causes?  This infection may be caused by fungi, viruses, or bacteria. Bacteria are the most common cause of UTIs. This condition can also be caused by repeated incomplete emptying of the bladder during urination.    What increases the risk?  This condition is more likely to develop if:    You ignore your need to urinate or hold urine for long periods of time.  You do not empty your bladder completely during urination.  You wipe back to front after urinating or having a bowel movement, if you are female.  You are uncircumcised, if you are male.  You are constipated.  You have a urinary catheter that stays in place (indwelling).  You have a weak defense (immune) system.  You have a medical condition that affects your bowels, kidneys, or bladder.  You have diabetes.  You take antibiotic medicines frequently or for long periods of time, and the antibiotics no longer work well against certain types of infections (antibiotic resistance).  You take medicines that irritate your urinary tract.  You are exposed to chemicals that irritate your urinary tract.  You are female.    What are the signs or symptoms?  Symptoms of this condition include:    Fever.  Frequent urination or passing small amounts of urine frequently.  Needing to urinate urgently.  Pain or burning with urination.  Urine that smells bad or unusual.  Cloudy urine.  Pain in the lower abdomen or back.  Trouble urinating.  Blood in the urine.  Vomiting or being less hungry than normal.  Diarrhea or abdominal pain.  Vaginal discharge, if you are female.    How is this diagnosed?  This condition is diagnosed with a medical history and physical exam. You will also need to provide a urine sample to test your urine. Other tests may be done, including:    Blood tests.  Sexually transmitted disease (STD) testing.    If you have had more than one UTI, a cystoscopy or imaging studies may be done to determine the cause of the infections.    How is this treated?  Treatment for this condition often includes a combination of two or more of the following:    Antibiotic medicine.  Other medicines to treat less common causes of UTI.  Over-the-counter medicines to treat pain.  Drinking enough water to stay hydrated.    Follow these instructions at home:  Take over-the-counter and prescription medicines only as told by your health care provider.  If you were prescribed an antibiotic, take it as told by your health care provider. Do not stop taking the antibiotic even if you start to feel better.  Avoid alcohol, caffeine, tea, and carbonated beverages. They can irritate your bladder.  Drink enough fluid to keep your urine clear or pale yellow.  Keep all follow-up visits as told by your health care provider. This is important.  ImageMake sure to:    Empty your bladder often and completely. Do not hold urine for long periods of time.  Empty your bladder before and after sex.  Wipe from front to back after a bowel movement if you are female. Use each tissue one time when you wipe.    Contact a health care provider if:  You have back pain.  You have a fever.  You feel nauseous or vomit.  Your symptoms do not get better after 3 days.  Your symptoms go away and then return.  Get help right away if:  You have severe back pain or lower abdominal pain.  You are vomiting and cannot keep down any medicines or water.  This information is not intended to replace advice given to you by your health care provider. Make sure you discuss any questions you have with your health care provider.

## 2020-11-28 NOTE — ED STATDOCS - PROGRESS NOTE DETAILS
31 y/o female with a PMHx of Depression, Factor VII deficiency, GERD, IBS, R ovarian cyst, presents to the ED c/o constant lower abdominal cramping x 2 days ago. Pt reports wearing a girdle for her outfit on 11/25 and during the following night she had burning urination and noticed spotting when she wiped after urinating.  Will rule out UTI secondary to the hematuria.  CT to rule out pelvic pathology.  La Morrison PA-C pt refusing antibiotics in the ER.  states she had a problem with SIBO in the past and is reluctant to take any antibiotics.  I stressed to her that she needs antibiotics given the severity of her pain and her UA results.  macrobid sent to the pharmacy for her.  I recommend starting them as soon as possible.  P

## 2020-11-28 NOTE — ED STATDOCS - OBJECTIVE STATEMENT
29 y/o female with a PMHx of Depression, Factor VII deficiency, GERD, IBS, R ovarian cyst, presents to the ED c/o constant lower abdominal cramping x 2 days ago. Pt reports wearing a girdle for her outfit on 11/25 and during the following night she had burning urination and noticed spotting when she wiped after urinating. Had another episode of mild spotting when she wiped afterwards, but has since resolved. States abdominal pain is improving with associated nausea. Denies pregnancy. No hx of UTI. No hx of abdominal surgery. LMP was last month.

## 2020-11-28 NOTE — ED STATDOCS - PATIENT PORTAL LINK FT
You can access the FollowMyHealth Patient Portal offered by [facility] by registering at the following website: [email or web address]. By joining [facility]’s FollowMyHealth portal, you will also be able to view your health information using other applications (apps) compatible with our system.

## 2020-11-28 NOTE — ED ADULT NURSE NOTE - OBJECTIVE STATEMENT
Pt presents ambulatory to ED c/o lower abdominal pain and cramping. Pt said she wore a gurdle for about 5 hours on thanksgiving and the pain started afterwards. Pt says she is supposed to start her period today, but hasn't. Pt feels nauseous, but denies NV, no SOB, no chest pain. Pt says she has hx of ovarian cysts she f/u with her OB. Pt presents ambulatory to ED c/o lower abdominal pain and cramping. Pt said she wore a gurdle for about 5 hours on thanksgiving and the pain started afterwards. Pt says she is supposed to start her period today, but hasn't. Pt feels nauseous, but denies NV, no SOB, no chest pain. Pt says she has hx of ovarian cysts she f/u with her OB. Pt says she started taking AZO on thursday because of painful urination.

## 2020-11-28 NOTE — ED STATDOCS - ATTENDING CONTRIBUTION TO CARE
I, Muna Goode MD,  performed the initial face to face bedside interview with this patient regarding history of present illness, review of symptoms and relevant past medical, social and family history.  I completed an independent physical examination.  I was the initial provider who evaluated this patient. I have signed out the follow up of any pending tests (i.e. labs, radiological studies) to the ACP.  I have communicated the patient’s plan of care and disposition with the ACP.  The history, relevant review of systems, past medical and surgical history, medical decision making, and physical examination was documented by the scribe in my presence and I attest to the accuracy of the documentation.

## 2020-11-28 NOTE — ED ADULT TRIAGE NOTE - CHIEF COMPLAINT QUOTE
Patient presents to ED complaining of vaginal cramping with spotting starting Thursday. Pt reports wearing a girdle for her outfit and noticed spotting upon urinating in the middle of the night which has since resolved. Pt states she does not think she could be pregnant. No medication taken PTA.

## 2020-11-28 NOTE — ED STATDOCS - CLINICAL SUMMARY MEDICAL DECISION MAKING FREE TEXT BOX
labs, CT, UA labs, CT, UA--> UA positive and cystitis on CTAP.  see progress note.  Supportive measures and return precautions discussed.

## 2020-12-31 ENCOUNTER — APPOINTMENT (OUTPATIENT)
Dept: OBGYN | Facility: CLINIC | Age: 30
End: 2020-12-31
Payer: COMMERCIAL

## 2020-12-31 PROBLEM — K63.89 SMALL INTESTINAL BACTERIAL OVERGROWTH: Status: ACTIVE | Noted: 2019-01-03

## 2020-12-31 PROCEDURE — 99072 ADDL SUPL MATRL&STAF TM PHE: CPT

## 2020-12-31 PROCEDURE — 99213 OFFICE O/P EST LOW 20 MIN: CPT

## 2021-01-06 ENCOUNTER — APPOINTMENT (OUTPATIENT)
Dept: UROGYNECOLOGY | Facility: CLINIC | Age: 31
End: 2021-01-06

## 2021-01-20 ENCOUNTER — APPOINTMENT (OUTPATIENT)
Dept: OBGYN | Facility: CLINIC | Age: 31
End: 2021-01-20

## 2021-02-18 ENCOUNTER — TRANSCRIPTION ENCOUNTER (OUTPATIENT)
Age: 31
End: 2021-02-18

## 2021-02-25 ENCOUNTER — APPOINTMENT (OUTPATIENT)
Dept: PULMONOLOGY | Facility: CLINIC | Age: 31
End: 2021-02-25
Payer: SELF-PAY

## 2021-02-25 PROCEDURE — 90837 PSYTX W PT 60 MINUTES: CPT | Mod: 95

## 2021-03-09 ENCOUNTER — APPOINTMENT (OUTPATIENT)
Dept: PULMONOLOGY | Facility: CLINIC | Age: 31
End: 2021-03-09
Payer: SELF-PAY

## 2021-03-09 PROCEDURE — 90834 PSYTX W PT 45 MINUTES: CPT | Mod: 95

## 2021-03-26 ENCOUNTER — APPOINTMENT (OUTPATIENT)
Dept: OBGYN | Facility: CLINIC | Age: 31
End: 2021-03-26
Payer: COMMERCIAL

## 2021-03-26 PROCEDURE — 76830 TRANSVAGINAL US NON-OB: CPT

## 2021-03-26 PROCEDURE — 36415 COLL VENOUS BLD VENIPUNCTURE: CPT

## 2021-03-26 PROCEDURE — 99072 ADDL SUPL MATRL&STAF TM PHE: CPT

## 2021-03-26 PROCEDURE — 99213 OFFICE O/P EST LOW 20 MIN: CPT | Mod: 25

## 2021-03-30 ENCOUNTER — APPOINTMENT (OUTPATIENT)
Dept: PULMONOLOGY | Facility: CLINIC | Age: 31
End: 2021-03-30

## 2021-05-21 ENCOUNTER — APPOINTMENT (OUTPATIENT)
Dept: OBGYN | Facility: CLINIC | Age: 31
End: 2021-05-21
Payer: COMMERCIAL

## 2021-05-21 PROCEDURE — 99395 PREV VISIT EST AGE 18-39: CPT

## 2021-06-02 ENCOUNTER — RESULT REVIEW (OUTPATIENT)
Age: 31
End: 2021-06-02

## 2021-06-02 ENCOUNTER — APPOINTMENT (OUTPATIENT)
Dept: ULTRASOUND IMAGING | Facility: CLINIC | Age: 31
End: 2021-06-02
Payer: COMMERCIAL

## 2021-06-02 ENCOUNTER — OUTPATIENT (OUTPATIENT)
Dept: OUTPATIENT SERVICES | Facility: HOSPITAL | Age: 31
LOS: 1 days | End: 2021-06-02
Payer: COMMERCIAL

## 2021-06-02 DIAGNOSIS — Z00.8 ENCOUNTER FOR OTHER GENERAL EXAMINATION: ICD-10-CM

## 2021-06-02 DIAGNOSIS — N92.6 IRREGULAR MENSTRUATION, UNSPECIFIED: ICD-10-CM

## 2021-06-02 DIAGNOSIS — Z98.890 OTHER SPECIFIED POSTPROCEDURAL STATES: Chronic | ICD-10-CM

## 2021-06-02 PROCEDURE — 76856 US EXAM PELVIC COMPLETE: CPT | Mod: 26,59

## 2021-06-02 PROCEDURE — 76830 TRANSVAGINAL US NON-OB: CPT

## 2021-06-02 PROCEDURE — 76856 US EXAM PELVIC COMPLETE: CPT

## 2021-06-02 PROCEDURE — 76830 TRANSVAGINAL US NON-OB: CPT | Mod: 26

## 2021-06-23 ENCOUNTER — APPOINTMENT (OUTPATIENT)
Dept: ULTRASOUND IMAGING | Facility: CLINIC | Age: 31
End: 2021-06-23
Payer: COMMERCIAL

## 2021-06-23 ENCOUNTER — RESULT REVIEW (OUTPATIENT)
Age: 31
End: 2021-06-23

## 2021-06-23 PROCEDURE — 76831 ECHO EXAM UTERUS: CPT

## 2021-06-23 PROCEDURE — 58340 CATHETER FOR HYSTEROGRAPHY: CPT

## 2021-07-28 NOTE — ED PROVIDER NOTE - BREATH SOUNDS
Patient was dropped off to the ED by his mom. He reports around 1am he was eating chicken and felt that a piece was caught in his throat. He states his mom gave him an \"enzyme\" that helped but now his throat is sore.
normal

## 2021-08-10 ENCOUNTER — APPOINTMENT (OUTPATIENT)
Dept: OBGYN | Facility: CLINIC | Age: 31
End: 2021-08-10

## 2021-08-23 ENCOUNTER — APPOINTMENT (OUTPATIENT)
Dept: HUMAN REPRODUCTION | Facility: CLINIC | Age: 31
End: 2021-08-23
Payer: COMMERCIAL

## 2021-08-23 PROCEDURE — 36415 COLL VENOUS BLD VENIPUNCTURE: CPT

## 2021-08-23 PROCEDURE — 99215 OFFICE O/P EST HI 40 MIN: CPT | Mod: 25

## 2021-08-24 ENCOUNTER — APPOINTMENT (OUTPATIENT)
Dept: OBGYN | Facility: CLINIC | Age: 31
End: 2021-08-24

## 2021-09-27 ENCOUNTER — APPOINTMENT (OUTPATIENT)
Dept: OBGYN | Facility: CLINIC | Age: 31
End: 2021-09-27
Payer: COMMERCIAL

## 2021-09-27 PROCEDURE — 99213 OFFICE O/P EST LOW 20 MIN: CPT

## 2021-11-05 ENCOUNTER — APPOINTMENT (OUTPATIENT)
Dept: HUMAN REPRODUCTION | Facility: CLINIC | Age: 31
End: 2021-11-05
Payer: COMMERCIAL

## 2021-11-05 PROCEDURE — 99214 OFFICE O/P EST MOD 30 MIN: CPT | Mod: 95

## 2021-12-02 ENCOUNTER — APPOINTMENT (OUTPATIENT)
Dept: PULMONOLOGY | Facility: CLINIC | Age: 31
End: 2021-12-02

## 2022-01-01 NOTE — ED PROVIDER NOTE - NS ED MD EM SELECTION
Infant resting comfortably in warm incubator w/VSS on CPAP +6, FiO2 21%. Infant has tolerated bolus 20cal/oz formula feedings well, no emesis noted. NAD noted. See flowsheet for full assessment. Will continue to monitor.    17896 Detailed

## 2022-01-18 ENCOUNTER — APPOINTMENT (OUTPATIENT)
Dept: GASTROENTEROLOGY | Facility: CLINIC | Age: 32
End: 2022-01-18
Payer: COMMERCIAL

## 2022-01-18 ENCOUNTER — OUTPATIENT (OUTPATIENT)
Dept: OUTPATIENT SERVICES | Facility: HOSPITAL | Age: 32
LOS: 1 days | Discharge: HOME | End: 2022-01-18

## 2022-01-18 VITALS
WEIGHT: 135 LBS | HEART RATE: 102 BPM | HEIGHT: 63 IN | SYSTOLIC BLOOD PRESSURE: 113 MMHG | DIASTOLIC BLOOD PRESSURE: 80 MMHG | BODY MASS INDEX: 23.92 KG/M2

## 2022-01-18 DIAGNOSIS — R79.1 ABNORMAL COAGULATION PROFILE: ICD-10-CM

## 2022-01-18 DIAGNOSIS — R19.7 DIARRHEA, UNSPECIFIED: ICD-10-CM

## 2022-01-18 DIAGNOSIS — Z98.890 OTHER SPECIFIED POSTPROCEDURAL STATES: Chronic | ICD-10-CM

## 2022-01-18 PROCEDURE — 99214 OFFICE O/P EST MOD 30 MIN: CPT

## 2022-01-18 RX ORDER — BENZONATATE 100 MG/1
100 CAPSULE ORAL
Qty: 30 | Refills: 0 | Status: DISCONTINUED | COMMUNITY
Start: 2021-10-22

## 2022-01-18 RX ORDER — RANITIDINE HYDROCHLORIDE 150 MG/1
150 TABLET, FILM COATED ORAL
Refills: 0 | Status: DISCONTINUED | COMMUNITY
End: 2022-01-18

## 2022-01-18 RX ORDER — CIPROFLOXACIN HYDROCHLORIDE 500 MG/1
500 TABLET, FILM COATED ORAL
Qty: 6 | Refills: 0 | Status: DISCONTINUED | COMMUNITY
Start: 2022-01-02

## 2022-01-18 RX ORDER — HYDROCORTISONE 25 MG/G
2.5 OINTMENT TOPICAL
Qty: 28 | Refills: 0 | Status: ACTIVE | COMMUNITY
Start: 2021-08-19

## 2022-01-18 NOTE — PHYSICAL EXAM
[General Appearance - Alert] : alert [General Appearance - In No Acute Distress] : in no acute distress [General Appearance - Well Nourished] : well nourished [Sclera] : the sclera and conjunctiva were normal [Extraocular Movements] : extraocular movements were intact [Outer Ear] : the ears and nose were normal in appearance [Hearing Threshold Finger Rub Not [place]] : hearing was normal [Neck Appearance] : the appearance of the neck was normal [Neck Cervical Mass (___cm)] : no neck mass was observed [Auscultation Breath Sounds / Voice Sounds] : lungs were clear to auscultation bilaterally [Heart Rate And Rhythm] : heart rate was normal and rhythm regular [Heart Sounds] : normal S1 and S2 [Edema] : there was no peripheral edema [Bowel Sounds] : normal bowel sounds [Abdomen Soft] : soft [Abdomen Tenderness] : non-tender [] : no hepato-splenomegaly [Abdomen Mass (___ Cm)] : no abdominal mass palpated [Abdomen Hernia] : no hernia was discovered [Cervical Lymph Nodes Enlarged Anterior Bilaterally] : anterior cervical [Supraclavicular Lymph Nodes Enlarged Bilaterally] : supraclavicular [No CVA Tenderness] : no ~M costovertebral angle tenderness [No Spinal Tenderness] : no spinal tenderness [Abnormal Walk] : normal gait [Skin Color & Pigmentation] : normal skin color and pigmentation [Skin Turgor] : normal skin turgor [No Focal Deficits] : no focal deficits [Oriented To Time, Place, And Person] : oriented to person, place, and time

## 2022-01-18 NOTE — ASSESSMENT
[FreeTextEntry1] : 1.  Diarrhea- may be related to IBS, infectious etiology, or SIBO.  History of IBS-mixed type and SIBO.  Colonoscopy in March 2017 with focally active colitis in descending colon, but otherwise unremarkable.  CT scan in February 2017 with moderate stool in colon, no inflammation. CT scan in June 2017 unremarkable.\par 2.  GERD with esophagitis, stable.  Upper endoscopy in 2019 reportedly normal;  EGD in February 2015 with esophagitis and erosive gastritis. \par 3.  Abnormal hydrogen breath testing suggesting small intestinal bacterial overgrowth (vs rapid transit), status post improvement on Xifaxan and Augmentin.\par 4.  Weight loss, resolved.\par 5.  History of epistaxis with Factor VII deficiency. \par 6.  Anxiety.\par 7.  Insomnia.\par 8.  CIN1.\par  \par Plan:\par - Prior records reviewed.\par - Stool studies ordered.\par - Use Florastor twice daily.\par - Patient was counseled on therapy for hemorrhoids, including avoiding sitting for prolonged periods of time, avoiding straining, high-fiber diet with adequate fluids to minimize constipation, Sitz baths 1-2 times a day, witch hazel pads and hydrocortisone suppositories.\par - Samples of Fiberchoice given today.

## 2022-01-18 NOTE — HISTORY OF PRESENT ILLNESS
[Heartburn] : denies heartburn [Nausea] : denies nausea [Vomiting] : denies vomiting [Constipation] : denies constipation [Yellow Skin Or Eyes (Jaundice)] : denies jaundice [Abdominal Swelling] : denies abdominal swelling [Rectal Pain] : denies rectal pain [Diarrhea] : diarrhea [_________] : Performed [unfilled] [de-identified] : Angel presents to the office today for follow up with complaints of irregular bowel habits and diarrhea.  She was last seen in the office for similar reasons in 2019.\par \par The patient was last seen in 2019 after she had been diagnosed with C.difficile by another gastroenterologist.  The patient reports that she went to see another GI at [facility] who did not feel her symptoms were related to C.difficile.  He advised her to take Florastor twice daily and eventually her BMs were normal.  She reports that in 2020 and 2021 she was mostly having formed stools.\par \par She again started to have diarrhea when she developed COVID in September 2021.  The diarrhea lasted for almost one month before improving but after that she was having foul-smelling stools.  In December, she was eating poorly and reports that she developed constipation, with scybalous stools every other day.  With constipation, her hemorrhoids flared and she has seen red blood with her BMs.  Over the past week, she has been again having loose stools, 1-2 times a day.  She is using hydrocortisone cream to her hemorrhoids and the bleeding has subsided. [de-identified] : normal [de-identified] : minimal gastritis [de-identified] : focal active colitis on random bx

## 2022-01-20 ENCOUNTER — APPOINTMENT (OUTPATIENT)
Dept: HEMATOLOGY ONCOLOGY | Facility: CLINIC | Age: 32
End: 2022-01-20

## 2022-02-07 ENCOUNTER — APPOINTMENT (OUTPATIENT)
Dept: HUMAN REPRODUCTION | Facility: CLINIC | Age: 32
End: 2022-02-07
Payer: COMMERCIAL

## 2022-02-07 ENCOUNTER — OUTPATIENT (OUTPATIENT)
Dept: OUTPATIENT SERVICES | Facility: HOSPITAL | Age: 32
LOS: 1 days | End: 2022-02-07
Payer: COMMERCIAL

## 2022-02-07 ENCOUNTER — APPOINTMENT (OUTPATIENT)
Dept: RADIOLOGY | Facility: HOSPITAL | Age: 32
End: 2022-02-07

## 2022-02-07 ENCOUNTER — RESULT REVIEW (OUTPATIENT)
Age: 32
End: 2022-02-07

## 2022-02-07 DIAGNOSIS — N97.1 FEMALE INFERTILITY OF TUBAL ORIGIN: ICD-10-CM

## 2022-02-07 DIAGNOSIS — Z98.890 OTHER SPECIFIED POSTPROCEDURAL STATES: Chronic | ICD-10-CM

## 2022-02-07 PROCEDURE — 58340 CATHETER FOR HYSTEROGRAPHY: CPT

## 2022-02-07 PROCEDURE — 74740 X-RAY FEMALE GENITAL TRACT: CPT | Mod: 59

## 2022-02-07 PROCEDURE — 74740 X-RAY FEMALE GENITAL TRACT: CPT

## 2022-02-07 PROCEDURE — 99214 OFFICE O/P EST MOD 30 MIN: CPT | Mod: 25

## 2022-02-14 ENCOUNTER — APPOINTMENT (OUTPATIENT)
Dept: HUMAN REPRODUCTION | Facility: CLINIC | Age: 32
End: 2022-02-14
Payer: COMMERCIAL

## 2022-02-14 LAB
BACTERIA STL CULT: NORMAL
C DIFF TOX GENS STL QL NAA+PROBE: NORMAL
CDIFF BY PCR: NOT DETECTED
DEPRECATED O AND P PREP STL: NORMAL
G LAMBLIA AG STL QL: NORMAL

## 2022-02-14 PROCEDURE — 99213 OFFICE O/P EST LOW 20 MIN: CPT | Mod: 25

## 2022-02-14 PROCEDURE — 76830 TRANSVAGINAL US NON-OB: CPT

## 2022-02-18 ENCOUNTER — APPOINTMENT (OUTPATIENT)
Dept: OTOLARYNGOLOGY | Facility: CLINIC | Age: 32
End: 2022-02-18
Payer: COMMERCIAL

## 2022-02-18 VITALS
DIASTOLIC BLOOD PRESSURE: 81 MMHG | HEIGHT: 63 IN | TEMPERATURE: 98.2 F | BODY MASS INDEX: 24.8 KG/M2 | HEART RATE: 116 BPM | WEIGHT: 140 LBS | SYSTOLIC BLOOD PRESSURE: 131 MMHG

## 2022-02-18 DIAGNOSIS — R04.0 EPISTAXIS: ICD-10-CM

## 2022-02-18 DIAGNOSIS — K64.8 OTHER HEMORRHOIDS: ICD-10-CM

## 2022-02-18 PROCEDURE — 31231 NASAL ENDOSCOPY DX: CPT

## 2022-02-18 PROCEDURE — 99214 OFFICE O/P EST MOD 30 MIN: CPT | Mod: 25

## 2022-02-18 RX ORDER — HYDROCORTISONE ACETATE 25 MG/1
25 SUPPOSITORY RECTAL
Qty: 24 | Refills: 2 | Status: DISCONTINUED | COMMUNITY
Start: 2018-05-09 | End: 2022-02-18

## 2022-02-18 RX ORDER — HYDROCORTISONE ACETATE 25 MG/1
25 SUPPOSITORY RECTAL
Qty: 28 | Refills: 1 | Status: ACTIVE | COMMUNITY
Start: 2022-02-18 | End: 1900-01-01

## 2022-02-18 NOTE — HISTORY OF PRESENT ILLNESS
[de-identified] : Patient with a known bleeding disorder (Factor VII deficiency) and has been getting frequent nosebleeds. She gets them mostly from the right nasal cavity but occasionally from the left side as well. She does admit that when the nose bleeds, it is heavy and she gets headaches. She has a history of rhinoplasty as well. The last nosebleed was two days ago from the right side. She has been cauterized in the past. \par She admits she has been using Flonase nasal spray, and has been getting frequently COVID tested

## 2022-02-18 NOTE — END OF VISIT
[FreeTextEntry3] : I saw and examined this patient in person. I have discussed with Briana Lechuga, Physician Assistant, in detail the above note and agree with the above assessment and plan of care.\par

## 2022-02-18 NOTE — ASSESSMENT
[FreeTextEntry1] : Patient with a factor VII deficiency is giving inconsistent history of how extensive her nosebleeds are from a recurrent every day to occasionally to not being too bad to being gushing.  On examination she had a prior rhinoplasty very narrow nose she has been using some nasal sprays which I told her to stop there are some very small vessels in the right anterior Kieran box plexus plexus that I would normally consider to cauterize but because she has a factor VII deficiency I feel that this will and the potential risk for recurrent or worsening of her bleeding sites.  This was explained to her in great detail I gave her instructions of what to do in the event of nosebleeds that should help control it and I told her to stop all nasal sprays which hopefully will avoid recurrence.

## 2022-03-01 ENCOUNTER — APPOINTMENT (OUTPATIENT)
Dept: HUMAN REPRODUCTION | Facility: CLINIC | Age: 32
End: 2022-03-01
Payer: COMMERCIAL

## 2022-03-01 PROCEDURE — 99214 OFFICE O/P EST MOD 30 MIN: CPT | Mod: 95

## 2022-03-03 ENCOUNTER — APPOINTMENT (OUTPATIENT)
Dept: OTOLARYNGOLOGY | Facility: CLINIC | Age: 32
End: 2022-03-03

## 2022-03-29 ENCOUNTER — APPOINTMENT (OUTPATIENT)
Dept: OBGYN | Facility: CLINIC | Age: 32
End: 2022-03-29
Payer: COMMERCIAL

## 2022-03-29 VITALS
SYSTOLIC BLOOD PRESSURE: 122 MMHG | BODY MASS INDEX: 23.92 KG/M2 | WEIGHT: 135 LBS | HEIGHT: 63 IN | DIASTOLIC BLOOD PRESSURE: 74 MMHG

## 2022-03-29 DIAGNOSIS — R82.90 UNSPECIFIED ABNORMAL FINDINGS IN URINE: ICD-10-CM

## 2022-03-29 DIAGNOSIS — N89.8 OTHER SPECIFIED NONINFLAMMATORY DISORDERS OF VAGINA: ICD-10-CM

## 2022-03-29 PROCEDURE — 99213 OFFICE O/P EST LOW 20 MIN: CPT

## 2022-03-29 NOTE — PLAN
[FreeTextEntry1] : - 32 year old patient, LMP, presents today for c/o vaginal dryness and a urine odor.\par - BD affirm\par - Urine culture \par - Advised to ask fertility specialist which lubricant is appropriate (partner's urologist advised none) \par - RTO for annual in May

## 2022-03-29 NOTE — HISTORY OF PRESENT ILLNESS
[FreeTextEntry1] : 32 year old patient, LMP, presents today for c/o vaginal dryness and a urine odor. Patient reports she and her  have been trying for 8 months to conceive; she has seen a fertility specialist ANNE  (Dr. Conn) and might go through with IUI; due to partner's low sperm count. \par \par Last annual May 2021 \par History of LEEP due to cervical dysplasia\par \par Patient reports her periods are every month, but they last 3 days, and spotting occurs after\par \par Mild factor 7 deficiency \par \par Patient works as a  for [employer] \par \par  [TextBox_4] : Pt c/o smelly urine, dryness at the beginning of intercourse, yellow discharge [PapSmeardate] : 6/2021 [LMPDate] : 3/24/22

## 2022-03-29 NOTE — PHYSICAL EXAM
[Labia Majora] : normal [Labia Minora] : normal [Normal] : normal [Uterine Adnexae] : normal [Appropriately responsive] : appropriately responsive [Alert] : alert [No Acute Distress] : no acute distress [Oriented x3] : oriented x3

## 2022-03-31 LAB
CANDIDA VAG CYTO: NOT DETECTED
G VAGINALIS+PREV SP MTYP VAG QL MICRO: NOT DETECTED
T VAGINALIS VAG QL WET PREP: NOT DETECTED

## 2022-04-05 ENCOUNTER — APPOINTMENT (OUTPATIENT)
Dept: HEMATOLOGY ONCOLOGY | Facility: CLINIC | Age: 32
End: 2022-04-05

## 2022-04-25 ENCOUNTER — APPOINTMENT (OUTPATIENT)
Dept: HUMAN REPRODUCTION | Facility: CLINIC | Age: 32
End: 2022-04-25

## 2022-07-08 ENCOUNTER — APPOINTMENT (OUTPATIENT)
Dept: OBGYN | Facility: CLINIC | Age: 32
End: 2022-07-08

## 2022-07-08 VITALS
HEIGHT: 63 IN | SYSTOLIC BLOOD PRESSURE: 137 MMHG | BODY MASS INDEX: 23.04 KG/M2 | DIASTOLIC BLOOD PRESSURE: 84 MMHG | WEIGHT: 130 LBS | HEART RATE: 111 BPM

## 2022-07-08 DIAGNOSIS — Z11.3 ENCOUNTER FOR SCREENING FOR INFECTIONS WITH A PREDOMINANTLY SEXUAL MODE OF TRANSMISSION: ICD-10-CM

## 2022-07-08 DIAGNOSIS — Z01.419 ENCOUNTER FOR GYNECOLOGICAL EXAMINATION (GENERAL) (ROUTINE) W/OUT ABNORMAL FINDINGS: ICD-10-CM

## 2022-07-08 DIAGNOSIS — Z11.51 ENCOUNTER FOR SCREENING FOR HUMAN PAPILLOMAVIRUS (HPV): ICD-10-CM

## 2022-07-08 PROCEDURE — 99395 PREV VISIT EST AGE 18-39: CPT

## 2022-07-08 NOTE — COUNSELING
Addended by: BOB NEVES on: 7/21/2020 02:13 PM     Modules accepted: Orders     [Nutrition/ Exercise/ Weight Management] : nutrition, exercise, weight management [Vitamins/Supplements] : vitamins/supplements [Breast Self Exam] : breast self exam [Preconception Care/ Fertility options] : preconception care, fertility options

## 2022-07-13 LAB
C TRACH RRNA SPEC QL NAA+PROBE: NOT DETECTED
HBV SURFACE AG SER QL: NONREACTIVE
HCV AB SER QL: NONREACTIVE
HCV S/CO RATIO: 0.09 S/CO
HIV1+2 AB SPEC QL IA.RAPID: NONREACTIVE
HPV HIGH+LOW RISK DNA PNL CVX: NOT DETECTED
N GONORRHOEA RRNA SPEC QL NAA+PROBE: NOT DETECTED
SOURCE TP AMPLIFICATION: NORMAL
T PALLIDUM AB SER QL IA: NEGATIVE

## 2022-07-13 NOTE — PLAN
[FreeTextEntry1] : 31 y/o  LMP 22 presents for annual wellness visit. \par \par Preconception counseling\par -Discussed natural methods to inc partner's sperm count \par -Referred for pt's partner reproductive urologist\par -Advised to restart PNVs\par \par HCM\par -Discussed and reviewed importance of monthly BSE\par -Pap test collected and sent at today's visit \par -STI bloodwork today.\par -f/u PCP for recommended HCM, vaccinations and CA screening\par -RTO 1 year

## 2022-07-13 NOTE — END OF VISIT
1900--bedside report received from micaela Durbin. Pt resting in bed oriented x 4. Denies pain, nausea. Receiving 1 unit PRBC's; call bell in reach. Assessment as noted    2300--family arrived to stay with pt, reports Megha Hdz is doing so much better now, on the other floor he was out of it\"    0550--dr. horvath notified [facility] 5.6. Pt asymptomatic, per dr. Praveen Chang will review chart then write orders, pt denies any vomiting, or BM's overnight.      0700--bedside report given to micaela Durbin who is assuming care of pt with attention to add on haptoglobin will need to be drawn with dialysis as lab does not have correct lab tube in lab to run test. [FreeTextEntry3] : I, Jena Gutierrez, acted as a scribe on behalf of Dr. Susy Vicente on 07/08/2022.\par \par All medical entries made by the scribe were at my, Dr. Susy Vicente, direction and personally dictated by me on 07/08/2022. I have reviewed the chart and agree that the record accurately reflects my personal performance of the history, physical exam, assessment and plan. I have also personally directed, reviewed, and agreed with the chart.

## 2022-07-13 NOTE — HISTORY OF PRESENT ILLNESS
[FreeTextEntry1] : 33 y/o  LMP 22 presents for annual wellness visit. Patient reports she and her  have been trying for 1 year to conceive; she has seen a fertility specialist ANNE (Dr. Conn) and might go through with IUI. Fertility workup normal for pt. SHG normal. Partner sperm count low; shape and movement normal as per pt. Partner sees urologist, who stated no natural methods to inc sperm count. Patient reports monthly menses, lasting 3 days, and spotting occurs after. Pt has not started Clomid. Pt is hesitant to continue with IUI due to risk of twins and strong desire to conceive naturally. Pt states ovulation testing. \par \par GYNHx: hx abn paps (s/p leep, neg path)\par PMHx: Mild factor 7 deficiency \par Social Hx: pt is a  for [employer]

## 2022-08-18 ENCOUNTER — NON-APPOINTMENT (OUTPATIENT)
Age: 32
End: 2022-08-18

## 2022-08-18 DIAGNOSIS — R10.13 EPIGASTRIC PAIN: ICD-10-CM

## 2022-08-19 ENCOUNTER — RX RENEWAL (OUTPATIENT)
Age: 32
End: 2022-08-19

## 2022-09-06 ENCOUNTER — APPOINTMENT (OUTPATIENT)
Dept: HUMAN REPRODUCTION | Facility: CLINIC | Age: 32
End: 2022-09-06

## 2022-09-06 PROCEDURE — 99215 OFFICE O/P EST HI 40 MIN: CPT | Mod: 95

## 2022-09-15 ENCOUNTER — RX RENEWAL (OUTPATIENT)
Age: 32
End: 2022-09-15

## 2022-09-15 RX ORDER — PANTOPRAZOLE 40 MG/1
40 TABLET, DELAYED RELEASE ORAL DAILY
Qty: 30 | Refills: 0 | Status: ACTIVE | COMMUNITY
Start: 2022-08-18 | End: 1900-01-01

## 2022-10-19 ENCOUNTER — APPOINTMENT (OUTPATIENT)
Dept: HUMAN REPRODUCTION | Facility: CLINIC | Age: 32
End: 2022-10-19

## 2022-10-19 PROCEDURE — 99213Y: CUSTOM | Mod: 25

## 2022-10-19 PROCEDURE — 76830 TRANSVAGINAL US NON-OB: CPT

## 2022-10-19 PROCEDURE — 36415 COLL VENOUS BLD VENIPUNCTURE: CPT

## 2022-10-25 ENCOUNTER — APPOINTMENT (OUTPATIENT)
Dept: HUMAN REPRODUCTION | Facility: CLINIC | Age: 32
End: 2022-10-25

## 2022-10-25 PROCEDURE — 36415 COLL VENOUS BLD VENIPUNCTURE: CPT

## 2022-10-25 PROCEDURE — 76830 TRANSVAGINAL US NON-OB: CPT

## 2022-10-25 PROCEDURE — 99213 OFFICE O/P EST LOW 20 MIN: CPT | Mod: 25

## 2022-11-15 ENCOUNTER — APPOINTMENT (OUTPATIENT)
Dept: HUMAN REPRODUCTION | Facility: CLINIC | Age: 32
End: 2022-11-15

## 2022-11-15 PROCEDURE — 76830 TRANSVAGINAL US NON-OB: CPT

## 2022-11-15 PROCEDURE — 36415 COLL VENOUS BLD VENIPUNCTURE: CPT

## 2022-11-15 PROCEDURE — S4042: CPT

## 2022-11-21 ENCOUNTER — APPOINTMENT (OUTPATIENT)
Dept: HUMAN REPRODUCTION | Facility: CLINIC | Age: 32
End: 2022-11-21

## 2022-11-21 PROCEDURE — 36415 COLL VENOUS BLD VENIPUNCTURE: CPT

## 2022-11-21 PROCEDURE — 76857 US EXAM PELVIC LIMITED: CPT

## 2022-11-21 PROCEDURE — 99213 OFFICE O/P EST LOW 20 MIN: CPT | Mod: 25

## 2022-12-17 ENCOUNTER — APPOINTMENT (OUTPATIENT)
Dept: MRI IMAGING | Facility: IMAGING CENTER | Age: 32
End: 2022-12-17

## 2022-12-17 ENCOUNTER — OUTPATIENT (OUTPATIENT)
Dept: OUTPATIENT SERVICES | Facility: HOSPITAL | Age: 32
LOS: 1 days | End: 2022-12-17
Payer: COMMERCIAL

## 2022-12-17 DIAGNOSIS — Z00.8 ENCOUNTER FOR OTHER GENERAL EXAMINATION: ICD-10-CM

## 2022-12-17 DIAGNOSIS — Z98.890 OTHER SPECIFIED POSTPROCEDURAL STATES: Chronic | ICD-10-CM

## 2022-12-17 PROCEDURE — 73718 MRI LOWER EXTREMITY W/O DYE: CPT

## 2022-12-17 PROCEDURE — 73718 MRI LOWER EXTREMITY W/O DYE: CPT | Mod: 26,LT

## 2023-01-26 ENCOUNTER — APPOINTMENT (OUTPATIENT)
Dept: HUMAN REPRODUCTION | Facility: CLINIC | Age: 33
End: 2023-01-26

## 2023-01-30 NOTE — ED BEHAVIORAL HEALTH ASSESSMENT NOTE - THOUGHT PROCESS
Significant Event Note    Time of event: 12:54 AM January 30, 2023    Description of event:  Na has risen after 2% NaCl: To 135 from 121 this afternoon. However it had been 132 on 1/26/23, before dropping to 121 earlier today; such an acute course might argue less risk of developing cerebral pontine myelinolysis from over-rapid Na correction.     Plan:    Salt tablets held, fluid restriction as able.    Discussed with: House Officer    Fernando Sierra MD     [facility]

## 2023-03-21 ENCOUNTER — APPOINTMENT (OUTPATIENT)
Dept: OBGYN | Facility: CLINIC | Age: 33
End: 2023-03-21
Payer: COMMERCIAL

## 2023-03-21 VITALS
HEIGHT: 63 IN | DIASTOLIC BLOOD PRESSURE: 80 MMHG | WEIGHT: 138 LBS | BODY MASS INDEX: 24.45 KG/M2 | SYSTOLIC BLOOD PRESSURE: 123 MMHG | HEART RATE: 110 BPM | OXYGEN SATURATION: 96 %

## 2023-03-21 DIAGNOSIS — R30.0 DYSURIA: ICD-10-CM

## 2023-03-21 PROCEDURE — 99213 OFFICE O/P EST LOW 20 MIN: CPT

## 2023-03-21 NOTE — PLAN
[FreeTextEntry1] : 34 yo female pt present for BV and STI testing\par \par -UCx\par -BD affirm\par -GC/CT\par -discussed ART\par \par

## 2023-03-21 NOTE — HISTORY OF PRESENT ILLNESS
[Patient reported PAP Smear was normal] : Patient reported PAP Smear was normal [FreeTextEntry1] : 32 yo female pt  present for vaginitis and STI testing.  The pt reports having burning with urination and yellow discharge.  Pt reports that her  had a cyst removed and subsequently developed red spots on his genitalia. He tested negative for HSV and was told he had jock itch. The pt reports she tested positive for HSV when she was younger, but unsure of type.  No hx outbreak.  The pt was last seen  for her annual, where she had a normal PAP.\par \par The pt has been trying to conceive for over a year, originally seeing someone at [facility], was advised IVF and declined.  Her partner has a low sperm count.  She started seeing someone at [facility] for a "natural" approach (using more supplements and vitamins). She was put on 100mg of progesterone as well.  [PapSmeardate] : 07/22

## 2023-03-23 LAB
BACTERIA UR CULT: NORMAL
C TRACH RRNA SPEC QL NAA+PROBE: NOT DETECTED
CANDIDA VAG CYTO: NOT DETECTED
G VAGINALIS+PREV SP MTYP VAG QL MICRO: NOT DETECTED
N GONORRHOEA RRNA SPEC QL NAA+PROBE: NOT DETECTED
SOURCE AMPLIFICATION: NORMAL
T VAGINALIS VAG QL WET PREP: NOT DETECTED

## 2023-04-10 ENCOUNTER — APPOINTMENT (OUTPATIENT)
Dept: PULMONOLOGY | Facility: CLINIC | Age: 33
End: 2023-04-10
Payer: COMMERCIAL

## 2023-04-10 VITALS
WEIGHT: 137 LBS | HEART RATE: 116 BPM | DIASTOLIC BLOOD PRESSURE: 74 MMHG | TEMPERATURE: 98 F | SYSTOLIC BLOOD PRESSURE: 118 MMHG | BODY MASS INDEX: 24.27 KG/M2 | OXYGEN SATURATION: 99 %

## 2023-04-10 PROCEDURE — 99214 OFFICE O/P EST MOD 30 MIN: CPT

## 2023-04-10 RX ORDER — ALBUTEROL SULFATE 90 UG/1
108 (90 BASE) INHALANT RESPIRATORY (INHALATION)
Qty: 1 | Refills: 2 | Status: ACTIVE | COMMUNITY
Start: 2023-04-10 | End: 1900-01-01

## 2023-04-10 NOTE — PROCEDURE
[FreeTextEntry1] : PFT 1/28/19: Spirometry was normal. Lung volumes and diffusion were normal. No change noted after a bronchodilator. \par \par Chest CT 6/19/17 (NW): 4 mm nodule in the RUL. No effusions. No adenopathy. No follow up advised. \par \par Sleep Study 11/11/17: Short REM latency, possibly due to depression. \par \par

## 2023-04-10 NOTE — REVIEW OF SYSTEMS
[Sinus Problems] : sinus problems [Cough] : cough [Hay Fever] : hay fever [Clotting Disorder] : clotting disorder [Fever] : no fever [Chills] : no chills [Fatigue] : no fatigue [Postnasal Drip] : no postnasal drip [Sputum] : not coughing up ~M sputum [Hemoptysis] : no hemoptysis [Dyspnea] : no dyspnea [Chest Tightness] : no chest tightness [Wheezing] : no wheezing [Hypertension] : no ~T hypertension [PND] : no PND [Edema] : ~T edema was not present [Back Pain] : ~T no back pain [Myalgias] : no myalgias [Headache] : no headache [Blood Transfusion] : no blood transfusion [Anxiety] : no anxiety [Diabetes] : no diabetes mellitus [Thyroid Problem] : no thyroid problem

## 2023-04-10 NOTE — PHYSICAL EXAM
[General Appearance - In No Acute Distress] : no acute distress [Neck Appearance] : the appearance of the neck was normal [Heart Rate And Rhythm] : heart rate and rhythm were normal [Heart Sounds] : normal S1 and S2 [Exaggerated Use Of Accessory Muscles For Inspiration] : no accessory muscle use [Auscultation Breath Sounds / Voice Sounds] : lungs were clear to auscultation bilaterally [Bowel Sounds] : normal bowel sounds [Abdomen Soft] : soft [Abnormal Walk] : normal gait [Nail Clubbing] : no clubbing of the fingernails [Cyanosis, Localized] : no localized cyanosis [] : no rash [No Focal Deficits] : no focal deficits [Oriented To Time, Place, And Person] : oriented to person, place, and time [Impaired Insight] : insight and judgment were intact [Well Groomed] : well groomed [General Appearance - Well Nourished] : well nourished [Neck Cervical Mass (___cm)] : no neck mass was observed

## 2023-04-10 NOTE — DISCUSSION/SUMMARY
[FreeTextEntry1] : She is a 33 year old never smoker with a history of mild reactive airways disease. \par \par Has had a cough for about a month.  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  There is no window nearby.The cough is mostly nonproductive.  No chest pain or shortness of breath.  No constitutional symptoms.She never smoked. Has one dog.\par \par It appears that she has reactive airways disease.  She was placed on albuterol, to use as needed.  A chest radiograph will be obtained.  Pulmonary function testing will also be obtained.  It may be that she will need to change the location of her task at work to minimize exposure to any irritants.

## 2023-04-13 ENCOUNTER — OUTPATIENT (OUTPATIENT)
Dept: OUTPATIENT SERVICES | Facility: HOSPITAL | Age: 33
LOS: 1 days | End: 2023-04-13
Payer: COMMERCIAL

## 2023-04-13 ENCOUNTER — APPOINTMENT (OUTPATIENT)
Dept: RADIOLOGY | Facility: CLINIC | Age: 33
End: 2023-04-13
Payer: COMMERCIAL

## 2023-04-13 DIAGNOSIS — Z00.8 ENCOUNTER FOR OTHER GENERAL EXAMINATION: ICD-10-CM

## 2023-04-13 DIAGNOSIS — Z98.890 OTHER SPECIFIED POSTPROCEDURAL STATES: Chronic | ICD-10-CM

## 2023-04-13 PROCEDURE — 71046 X-RAY EXAM CHEST 2 VIEWS: CPT

## 2023-04-13 PROCEDURE — 71046 X-RAY EXAM CHEST 2 VIEWS: CPT | Mod: 26

## 2023-04-27 ENCOUNTER — APPOINTMENT (OUTPATIENT)
Dept: PULMONOLOGY | Facility: CLINIC | Age: 33
End: 2023-04-27
Payer: COMMERCIAL

## 2023-04-27 VITALS
HEART RATE: 102 BPM | SYSTOLIC BLOOD PRESSURE: 106 MMHG | OXYGEN SATURATION: 99 % | DIASTOLIC BLOOD PRESSURE: 80 MMHG | WEIGHT: 140 LBS | TEMPERATURE: 97.7 F | BODY MASS INDEX: 24.8 KG/M2

## 2023-04-27 DIAGNOSIS — R05.9 COUGH, UNSPECIFIED: ICD-10-CM

## 2023-04-27 DIAGNOSIS — J45.909 UNSPECIFIED ASTHMA, UNCOMPLICATED: ICD-10-CM

## 2023-04-27 PROCEDURE — 94727 GAS DIL/WSHOT DETER LNG VOL: CPT

## 2023-04-27 PROCEDURE — 94729 DIFFUSING CAPACITY: CPT

## 2023-04-27 PROCEDURE — 94060 EVALUATION OF WHEEZING: CPT

## 2023-04-27 PROCEDURE — 99213 OFFICE O/P EST LOW 20 MIN: CPT | Mod: 25

## 2023-04-27 RX ORDER — FLUTICASONE FUROATE AND VILANTEROL TRIFENATATE 100; 25 UG/1; UG/1
100-25 POWDER RESPIRATORY (INHALATION)
Qty: 1 | Refills: 2 | Status: ACTIVE | COMMUNITY
Start: 2023-04-27 | End: 1900-01-01

## 2023-04-27 NOTE — REVIEW OF SYSTEMS
[Sinus Problems] : sinus problems [Cough] : cough [Hay Fever] : hay fever [Clotting Disorder] : clotting disorder [Nasal Congestion] : nasal congestion [Fatigue] : no fatigue [Sputum] : not coughing up ~M sputum [Chest Tightness] : no chest tightness [Wheezing] : no wheezing [Hypertension] : no ~T hypertension [PND] : no PND [Edema] : ~T edema was not present [Back Pain] : ~T no back pain [Myalgias] : no myalgias [Blood Transfusion] : no blood transfusion [Headache] : no headache [Anxiety] : no anxiety [Diabetes] : no diabetes mellitus [Thyroid Problem] : no thyroid problem

## 2023-04-27 NOTE — DISCUSSION/SUMMARY
[FreeTextEntry1] : She is a 33 year old never smoker with a history of mild reactive airways disease. \par \par Impression:\par Reactive airways disease\par - not well controlled\par - appears to be aggravated by environmental factors\par \par Recommend: \par Add Breo\par To continue with albuterol as needed. \par May need to see ENT \par Follow up in a few months advised\par

## 2023-04-27 NOTE — PROCEDURE
[FreeTextEntry1] : PFT 1/28/19: Spirometry was normal. Lung volumes and diffusion were normal. No change noted after a bronchodilator. \par \par PFT 4/27/23: Spirometry was normal. Moderate restriction. Mild reduction in diffusion.  Marked improvement noted after administration of a bronchodilator.\par \par Chest CT 6/19/17 (NW): 4 mm nodule in the RUL. No effusions. No adenopathy. No follow up advised. \par \par Chest x-ray 4/13/23: No active disease. \par \par Sleep Study 11/11/17: Short REM latency, possibly due to depression.

## 2023-04-27 NOTE — HISTORY OF PRESENT ILLNESS
[Never] : never [TextBox_4] : Has had a cough since February. 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  No chest pain or shortness of breath.  No constitutional symptoms.She never smoked. \par \par Uses albuterol regularly. [Awakes Unrefreshed] : does not awaken unrefreshed

## 2023-04-27 NOTE — PHYSICAL EXAM
[Well Groomed] : well groomed [General Appearance - Well Nourished] : well nourished [General Appearance - In No Acute Distress] : no acute distress [Neck Appearance] : the appearance of the neck was normal [Heart Sounds] : normal S1 and S2 [Exaggerated Use Of Accessory Muscles For Inspiration] : no accessory muscle use [Auscultation Breath Sounds / Voice Sounds] : lungs were clear to auscultation bilaterally [Bowel Sounds] : normal bowel sounds [Abdomen Soft] : soft [Abnormal Walk] : normal gait [Nail Clubbing] : no clubbing of the fingernails [Cyanosis, Localized] : no localized cyanosis [] : no rash [No Focal Deficits] : no focal deficits [Oriented To Time, Place, And Person] : oriented to person, place, and time

## 2023-05-16 ENCOUNTER — NON-APPOINTMENT (OUTPATIENT)
Age: 33
End: 2023-05-16

## 2023-05-17 NOTE — PRE-ANESTHESIA EVALUATION ADULT - LAST ECHOCARDIOGRAM
denies Mustarde Flap Text: The defect edges were debeveled with a #15 scalpel blade.  Given the size, depth and location of the defect and the proximity to free margins a Mustarde flap was deemed most appropriate.  Using a sterile surgical marker, an appropriate flap was drawn incorporating the defect. The area thus outlined was incised with a #15 scalpel blade.  The skin margins were undermined to an appropriate distance in all directions utilizing iris scissors.

## 2023-05-18 ENCOUNTER — NON-APPOINTMENT (OUTPATIENT)
Age: 33
End: 2023-05-18

## 2023-06-20 ENCOUNTER — APPOINTMENT (OUTPATIENT)
Dept: HUMAN REPRODUCTION | Facility: CLINIC | Age: 33
End: 2023-06-20
Payer: COMMERCIAL

## 2023-06-20 PROCEDURE — 99215 OFFICE O/P EST HI 40 MIN: CPT | Mod: 95

## 2023-06-27 ENCOUNTER — OUTPATIENT (OUTPATIENT)
Dept: OUTPATIENT SERVICES | Facility: HOSPITAL | Age: 33
LOS: 1 days | Discharge: HOME | End: 2023-06-27

## 2023-06-27 DIAGNOSIS — R79.1 ABNORMAL COAGULATION PROFILE: ICD-10-CM

## 2023-06-27 DIAGNOSIS — Z98.890 OTHER SPECIFIED POSTPROCEDURAL STATES: Chronic | ICD-10-CM

## 2023-07-07 ENCOUNTER — APPOINTMENT (OUTPATIENT)
Dept: HEMATOLOGY ONCOLOGY | Facility: CLINIC | Age: 33
End: 2023-07-07

## 2023-07-13 ENCOUNTER — APPOINTMENT (OUTPATIENT)
Dept: HUMAN REPRODUCTION | Facility: CLINIC | Age: 33
End: 2023-07-13
Payer: COMMERCIAL

## 2023-07-13 PROCEDURE — 76831 ECHO EXAM UTERUS: CPT

## 2023-07-13 PROCEDURE — 58999I: CUSTOM

## 2023-07-13 PROCEDURE — 58340 CATHETER FOR HYSTEROGRAPHY: CPT

## 2023-07-21 ENCOUNTER — APPOINTMENT (OUTPATIENT)
Dept: HUMAN REPRODUCTION | Facility: CLINIC | Age: 33
End: 2023-07-21
Payer: COMMERCIAL

## 2023-07-21 PROCEDURE — 76830 TRANSVAGINAL US NON-OB: CPT

## 2023-07-21 PROCEDURE — 99213 OFFICE O/P EST LOW 20 MIN: CPT | Mod: 25

## 2023-07-21 PROCEDURE — 36415 COLL VENOUS BLD VENIPUNCTURE: CPT

## 2023-07-28 ENCOUNTER — APPOINTMENT (OUTPATIENT)
Dept: OBGYN | Facility: CLINIC | Age: 33
End: 2023-07-28
Payer: COMMERCIAL

## 2023-07-28 VITALS
WEIGHT: 140 LBS | HEART RATE: 117 BPM | DIASTOLIC BLOOD PRESSURE: 75 MMHG | SYSTOLIC BLOOD PRESSURE: 128 MMHG | BODY MASS INDEX: 24.8 KG/M2 | OXYGEN SATURATION: 98 % | HEIGHT: 63 IN

## 2023-07-28 DIAGNOSIS — N76.0 ACUTE VAGINITIS: ICD-10-CM

## 2023-07-28 DIAGNOSIS — B96.89 ACUTE VAGINITIS: ICD-10-CM

## 2023-07-28 PROCEDURE — 99395 PREV VISIT EST AGE 18-39: CPT

## 2023-08-02 ENCOUNTER — APPOINTMENT (OUTPATIENT)
Dept: HEMATOLOGY ONCOLOGY | Facility: CLINIC | Age: 33
End: 2023-08-02

## 2023-08-08 PROBLEM — N76.0 BACTERIAL VAGINOSIS: Status: ACTIVE | Noted: 2023-08-08

## 2023-08-08 LAB
BACTERIA UR CULT: NORMAL
CANDIDA VAG CYTO: NOT DETECTED
CYTOLOGY CVX/VAG DOC THIN PREP: NORMAL
G VAGINALIS+PREV SP MTYP VAG QL MICRO: DETECTED
HPV HIGH+LOW RISK DNA PNL CVX: NOT DETECTED
T VAGINALIS VAG QL WET PREP: NOT DETECTED

## 2023-08-08 RX ORDER — FLUCONAZOLE 150 MG/1
150 TABLET ORAL
Qty: 2 | Refills: 1 | Status: ACTIVE | COMMUNITY
Start: 2023-08-08 | End: 1900-01-01

## 2023-08-08 RX ORDER — METRONIDAZOLE 7.5 MG/G
0.75 GEL VAGINAL
Qty: 1 | Refills: 0 | Status: ACTIVE | COMMUNITY
Start: 2023-08-08 | End: 1900-01-01

## 2023-08-08 NOTE — HISTORY OF PRESENT ILLNESS
[FreeTextEntry1] : 32 y/o Pt presenting for annual exam. Pt is seeing Dr. Conn due to problems with conceiving. Pt has been trying for about a year to a year and a half. Pt got a water sono which left her in pain after. Pt was told it was very hard to do her water sono and it was performed by one of the fellows not Dr. Conn. When she returned to office a week later do to the cramping pain they told her she was ovulating. Pt has a polyp but was told it was far away from where the baby would go. They told her it is safe for now however they would want it removed if she began IVF.

## 2023-08-14 ENCOUNTER — APPOINTMENT (OUTPATIENT)
Dept: HEMATOLOGY ONCOLOGY | Facility: CLINIC | Age: 33
End: 2023-08-14
Payer: COMMERCIAL

## 2023-08-14 ENCOUNTER — RESULT REVIEW (OUTPATIENT)
Age: 33
End: 2023-08-14

## 2023-08-14 VITALS
WEIGHT: 139.77 LBS | RESPIRATION RATE: 16 BRPM | BODY MASS INDEX: 24.77 KG/M2 | HEIGHT: 63 IN | TEMPERATURE: 97.2 F | SYSTOLIC BLOOD PRESSURE: 117 MMHG | HEART RATE: 101 BPM | DIASTOLIC BLOOD PRESSURE: 82 MMHG | OXYGEN SATURATION: 99 %

## 2023-08-14 LAB
BASOPHILS # BLD AUTO: 0.02 K/UL — SIGNIFICANT CHANGE UP (ref 0–0.2)
BASOPHILS NFR BLD AUTO: 0.3 % — SIGNIFICANT CHANGE UP (ref 0–2)
EOSINOPHIL # BLD AUTO: 0.32 K/UL — SIGNIFICANT CHANGE UP (ref 0–0.5)
EOSINOPHIL NFR BLD AUTO: 5.2 % — SIGNIFICANT CHANGE UP (ref 0–6)
HCT VFR BLD CALC: 42.7 % — SIGNIFICANT CHANGE UP (ref 34.5–45)
HGB BLD-MCNC: 13.6 G/DL — SIGNIFICANT CHANGE UP (ref 11.5–15.5)
IMM GRANULOCYTES NFR BLD AUTO: 0.3 % — SIGNIFICANT CHANGE UP (ref 0–0.9)
LYMPHOCYTES # BLD AUTO: 1.86 K/UL — SIGNIFICANT CHANGE UP (ref 1–3.3)
LYMPHOCYTES # BLD AUTO: 30.4 % — SIGNIFICANT CHANGE UP (ref 13–44)
MCHC RBC-ENTMCNC: 27.1 PG — SIGNIFICANT CHANGE UP (ref 27–34)
MCHC RBC-ENTMCNC: 31.9 G/DL — LOW (ref 32–36)
MCV RBC AUTO: 85.2 FL — SIGNIFICANT CHANGE UP (ref 80–100)
MONOCYTES # BLD AUTO: 0.43 K/UL — SIGNIFICANT CHANGE UP (ref 0–0.9)
MONOCYTES NFR BLD AUTO: 7 % — SIGNIFICANT CHANGE UP (ref 2–14)
NEUTROPHILS # BLD AUTO: 3.46 K/UL — SIGNIFICANT CHANGE UP (ref 1.8–7.4)
NEUTROPHILS NFR BLD AUTO: 56.8 % — SIGNIFICANT CHANGE UP (ref 43–77)
NRBC # BLD: 0 /100 WBCS — SIGNIFICANT CHANGE UP (ref 0–0)
PLATELET # BLD AUTO: 336 K/UL — SIGNIFICANT CHANGE UP (ref 150–400)
RBC # BLD: 5.01 M/UL — SIGNIFICANT CHANGE UP (ref 3.8–5.2)
RBC # FLD: 12.5 % — SIGNIFICANT CHANGE UP (ref 10.3–14.5)
WBC # BLD: 6.11 K/UL — SIGNIFICANT CHANGE UP (ref 3.8–10.5)
WBC # FLD AUTO: 6.11 K/UL — SIGNIFICANT CHANGE UP (ref 3.8–10.5)

## 2023-08-14 PROCEDURE — 99205 OFFICE O/P NEW HI 60 MIN: CPT

## 2023-08-15 LAB
RETICS #: 70.6 K/UL — SIGNIFICANT CHANGE UP (ref 25–125)
RETICS/RBC NFR: 1.4 % — SIGNIFICANT CHANGE UP (ref 0.5–2.5)

## 2023-08-18 LAB
APTT BLD: 34.6 SEC
FACT VII ACT/NOR PPP: 33 %
FERRITIN SERPL-MCNC: 32 NG/ML
INR PPP: 1.27 RATIO
IRON SATN MFR SERPL: 10 %
IRON SERPL-MCNC: 34 UG/DL
PT BLD: 14.3 SEC
TIBC SERPL-MCNC: 346 UG/DL
UIBC SERPL-MCNC: 312 UG/DL

## 2023-08-18 NOTE — HISTORY OF PRESENT ILLNESS
[de-identified] : 33 year old woman with a history of heterozygous Factor VII mutation.   Levels in excess of 20% had not required factor or FFP for procedures in the past.   Ms. London was referred to my office after a [facility] ED visit on 2/6/17 for epistaxis and elevated PT. The patient has been having nose bleeds for this past month lasting between 10 min to 1 hour, from L nostril. She has had 3 cauterizations by ENT for it, most recently on 2/2/17. She reports no recent trauma to nose. She has had intermittent epistaxis in the past, but not to this extent. No bruising history, no FHx of bleeding (has 1 older brother). She has regular menses, lasting for about 7 days, 3 pads/day, not heavy, no blood clots.   Factor VII activty 20-27%.    Patient had a leep procedure, no factor administered no bleeding, then had a rhinoplasty following this, patient was treated with Novo7 leading up to the surgery, with this patient did not experience much post surgical bleeding after rhinoplasty.    patient has had some slight epistaxis since then.  They do stop bleeding after a short while during the winter.   Otherwise normal bruises and cuts have a normal clinical course.   Her last menstrual period was more heavy, but otherwise menses were normal.   She now presents for hematologic evaluation prior to an ovum retrieval with Dr. Yeimy Conn.

## 2023-08-18 NOTE — ASSESSMENT
[FreeTextEntry1] : Ms. London is a 34yo F known to me for a history of high Pt/INR - diagnosed with mild factor VII deficiency. She was last seen in my office on 5/7/18 for hematological clearance for LEEP, which is scheduled for 5/30/18.  During this procedure, patient did not require treatment with factor.  Did not experience excessive bleeding after this.  Since last visit, patient has had a colonoscopy with polypectomy without any bleeding complications. She denied any nose bleeds/spontaneous bruising, and has had no melena/BRBPR.  Given patient's factor VII level is above 20%, and given that she has not had bleeding post-invasive procedures, she is hematologicaly cleared for LEEP procedure without any prophylactic treatment. .Since then she proceded to a rhinoplasty for which she was treated with novo7 prophylactically, and this was not complicated by post operative bleeding.    She now presents for recommendations prior to an ovum harvesting procedure.   During repeat analysis, Factor VII activity 33%  PTT normal at 34.6 sec  INR modestly prolonged at PT 14.3sec.  INR 1.27 ratio.   It appears that over the few years of time, Factor VII activity improved slightly. At values > 30 % there is sufficient activity to facilitate normal hemostasis.  Do not feel that prophylactic administration of Novoseven is indicated prior to procedure.   Patient is hematologicaly cleared for upcoming ovum retrieval, and does not require factor or blood products preoperatively for optimization.   If bleeding does occur postoperatively, patient can receive Novoseven 1000mcg IV Q 12 hours for as long as the bleeding is still evident.  On a side note, patient does have a mild iron deficiency with an iron saturation 10%, normal hemoglobin 13.6g/dl. Recommend patient take an iron supplement to optimize.

## 2023-08-21 DIAGNOSIS — E61.1 IRON DEFICIENCY: ICD-10-CM

## 2023-08-21 RX ORDER — FERROUS GLUCONATE 324(38)MG
324 (38 FE) TABLET ORAL DAILY
Qty: 30 | Refills: 2 | Status: ACTIVE | COMMUNITY
Start: 2023-08-21 | End: 1900-01-01

## 2023-08-30 ENCOUNTER — APPOINTMENT (OUTPATIENT)
Dept: HUMAN REPRODUCTION | Facility: CLINIC | Age: 33
End: 2023-08-30
Payer: COMMERCIAL

## 2023-08-30 PROCEDURE — 76857 US EXAM PELVIC LIMITED: CPT

## 2023-08-30 PROCEDURE — 36415 COLL VENOUS BLD VENIPUNCTURE: CPT

## 2023-08-30 PROCEDURE — 99213 OFFICE O/P EST LOW 20 MIN: CPT | Mod: 25

## 2023-09-05 ENCOUNTER — APPOINTMENT (OUTPATIENT)
Dept: HUMAN REPRODUCTION | Facility: CLINIC | Age: 33
End: 2023-09-05

## 2023-09-06 ENCOUNTER — TRANSCRIPTION ENCOUNTER (OUTPATIENT)
Age: 33
End: 2023-09-06

## 2023-09-06 ENCOUNTER — OUTPATIENT (OUTPATIENT)
Dept: OUTPATIENT SERVICES | Facility: HOSPITAL | Age: 33
LOS: 1 days | End: 2023-09-06
Payer: COMMERCIAL

## 2023-09-06 VITALS
OXYGEN SATURATION: 99 % | HEIGHT: 63 IN | TEMPERATURE: 98 F | RESPIRATION RATE: 18 BRPM | HEART RATE: 93 BPM | SYSTOLIC BLOOD PRESSURE: 104 MMHG | WEIGHT: 136.91 LBS | DIASTOLIC BLOOD PRESSURE: 67 MMHG

## 2023-09-06 DIAGNOSIS — N84.0 POLYP OF CORPUS UTERI: ICD-10-CM

## 2023-09-06 DIAGNOSIS — Z98.890 OTHER SPECIFIED POSTPROCEDURAL STATES: Chronic | ICD-10-CM

## 2023-09-06 DIAGNOSIS — Z01.818 ENCOUNTER FOR OTHER PREPROCEDURAL EXAMINATION: ICD-10-CM

## 2023-09-06 LAB
HCG SERPL-ACNC: <2 MIU/ML — SIGNIFICANT CHANGE UP
HCT VFR BLD CALC: 41.6 % — SIGNIFICANT CHANGE UP (ref 34.5–45)
HGB BLD-MCNC: 13.5 G/DL — SIGNIFICANT CHANGE UP (ref 11.5–15.5)
MCHC RBC-ENTMCNC: 27.8 PG — SIGNIFICANT CHANGE UP (ref 27–34)
MCHC RBC-ENTMCNC: 32.5 GM/DL — SIGNIFICANT CHANGE UP (ref 32–36)
MCV RBC AUTO: 85.8 FL — SIGNIFICANT CHANGE UP (ref 80–100)
NRBC # BLD: 0 /100 WBCS — SIGNIFICANT CHANGE UP (ref 0–0)
PLATELET # BLD AUTO: 310 K/UL — SIGNIFICANT CHANGE UP (ref 150–400)
RBC # BLD: 4.85 M/UL — SIGNIFICANT CHANGE UP (ref 3.8–5.2)
RBC # FLD: 12.8 % — SIGNIFICANT CHANGE UP (ref 10.3–14.5)
WBC # BLD: 6.14 K/UL — SIGNIFICANT CHANGE UP (ref 3.8–10.5)
WBC # FLD AUTO: 6.14 K/UL — SIGNIFICANT CHANGE UP (ref 3.8–10.5)

## 2023-09-06 PROCEDURE — 84702 CHORIONIC GONADOTROPIN TEST: CPT

## 2023-09-06 PROCEDURE — 36415 COLL VENOUS BLD VENIPUNCTURE: CPT

## 2023-09-06 PROCEDURE — G0463: CPT

## 2023-09-06 PROCEDURE — 85027 COMPLETE CBC AUTOMATED: CPT

## 2023-09-06 RX ORDER — SODIUM CHLORIDE 9 MG/ML
1000 INJECTION, SOLUTION INTRAVENOUS
Refills: 0 | Status: DISCONTINUED | OUTPATIENT
Start: 2023-09-07 | End: 2023-09-21

## 2023-09-06 RX ORDER — ZOLPIDEM TARTRATE 10 MG/1
1 TABLET ORAL
Qty: 0 | Refills: 0 | DISCHARGE

## 2023-09-06 RX ORDER — HYDROCORTISONE 1 %
1 OINTMENT (GRAM) TOPICAL
Qty: 0 | Refills: 0 | DISCHARGE

## 2023-09-06 RX ORDER — SODIUM CHLORIDE 9 MG/ML
3 INJECTION INTRAMUSCULAR; INTRAVENOUS; SUBCUTANEOUS EVERY 8 HOURS
Refills: 0 | Status: DISCONTINUED | OUTPATIENT
Start: 2023-09-07 | End: 2023-09-21

## 2023-09-06 NOTE — H&P PST ADULT - NSANTHAGERD_ENT_A_CORE
Progressive Muscle Relaxation  [email or web address]   No

## 2023-09-06 NOTE — H&P PST ADULT - NSICDXPASTSURGICALHX_GEN_ALL_CORE_FT
PAST SURGICAL HISTORY:  H/O rhinoplasty     History of colonoscopy Feb 2017 (Dx IBS/GERD)    S/P LEEP (loop electrosurgical excision procedure)

## 2023-09-06 NOTE — H&P PST ADULT - NSICDXFAMILYHX_GEN_ALL_CORE_FT
FAMILY HISTORY:  Mother  Still living? Unknown  Family history of bleeding disorder, Age at diagnosis: Age Unknown

## 2023-09-06 NOTE — H&P PST ADULT - LAST STRESS TEST
Next appt 9/18/20  Last appt 9/17/19    Refill request for  salsalate (DISALCID) 500 MG tablet 360 tablet 0 1/6/2020     Sig - Route: Take 2 tablets by mouth 2 times daily. - Oral    Sent to pharmacy as: Salsalate 500 MG Oral Tablet    Class: Eprescribe    Notes to Pharmacy: Please consider 90 day supplies to promote better adherence    E-Prescribing Status: Receipt confirmed by pharmacy (1/6/2020  2:54 PM CST)          Refilled per standing med protocol.   None

## 2023-09-06 NOTE — H&P PST ADULT - NSICDXPASTMEDICALHX_GEN_ALL_CORE_FT
PAST MEDICAL HISTORY:  Asthma     Depression     Factor VII deficiency     GERD (gastroesophageal reflux disease)     Irritable bowel syndrome, unspecified type     Other hemorrhoids Internal and External    Polyp of corpus uteri     Right ovarian cyst

## 2023-09-06 NOTE — H&P PST ADULT - HISTORY OF PRESENT ILLNESS
Ms. London is a 34 yearold woman with PMH of polyp of corpus uteri, factor VII deficiency scheduled for hysteroscopy and polypectomy on 9/7/23. Patient denies vaginal bleeding at this time.

## 2023-09-06 NOTE — H&P PST ADULT - PROBLEM SELECTOR PLAN 1
Patient scheduled for hysteroscopy and polypectomy on 9/7/23  Continue Birth control pill.  Patient denies vaginal bleeding.   CBC and pregnancy test done.   Pre-op education provided.

## 2023-09-07 ENCOUNTER — RESULT REVIEW (OUTPATIENT)
Age: 33
End: 2023-09-07

## 2023-09-07 ENCOUNTER — APPOINTMENT (OUTPATIENT)
Dept: HUMAN REPRODUCTION | Facility: HOSPITAL | Age: 33
End: 2023-09-07
Payer: COMMERCIAL

## 2023-09-07 ENCOUNTER — OUTPATIENT (OUTPATIENT)
Dept: OUTPATIENT SERVICES | Facility: HOSPITAL | Age: 33
LOS: 1 days | End: 2023-09-07
Payer: COMMERCIAL

## 2023-09-07 ENCOUNTER — APPOINTMENT (OUTPATIENT)
Dept: HUMAN REPRODUCTION | Facility: CLINIC | Age: 33
End: 2023-09-07

## 2023-09-07 ENCOUNTER — TRANSCRIPTION ENCOUNTER (OUTPATIENT)
Age: 33
End: 2023-09-07

## 2023-09-07 VITALS
DIASTOLIC BLOOD PRESSURE: 75 MMHG | RESPIRATION RATE: 16 BRPM | TEMPERATURE: 97 F | OXYGEN SATURATION: 100 % | SYSTOLIC BLOOD PRESSURE: 105 MMHG | HEART RATE: 65 BPM

## 2023-09-07 VITALS
HEART RATE: 100 BPM | SYSTOLIC BLOOD PRESSURE: 107 MMHG | WEIGHT: 136.91 LBS | TEMPERATURE: 97 F | DIASTOLIC BLOOD PRESSURE: 71 MMHG | RESPIRATION RATE: 16 BRPM | HEIGHT: 63 IN | OXYGEN SATURATION: 99 %

## 2023-09-07 DIAGNOSIS — Z98.890 OTHER SPECIFIED POSTPROCEDURAL STATES: Chronic | ICD-10-CM

## 2023-09-07 DIAGNOSIS — N84.0 POLYP OF CORPUS UTERI: ICD-10-CM

## 2023-09-07 PROCEDURE — 58558 HYSTEROSCOPY BIOPSY: CPT | Mod: NC

## 2023-09-07 PROCEDURE — C1782: CPT

## 2023-09-07 PROCEDURE — 58559 HYSTEROSCOPY LYSIS: CPT

## 2023-09-07 PROCEDURE — 58559 HYSTEROSCOPY LYSIS: CPT | Mod: NC

## 2023-09-07 PROCEDURE — 86901 BLOOD TYPING SEROLOGIC RH(D): CPT

## 2023-09-07 PROCEDURE — 88305 TISSUE EXAM BY PATHOLOGIST: CPT

## 2023-09-07 PROCEDURE — 86850 RBC ANTIBODY SCREEN: CPT

## 2023-09-07 PROCEDURE — 88305 TISSUE EXAM BY PATHOLOGIST: CPT | Mod: 26

## 2023-09-07 PROCEDURE — 76998 US GUIDE INTRAOP: CPT | Mod: NC

## 2023-09-07 PROCEDURE — 86900 BLOOD TYPING SEROLOGIC ABO: CPT

## 2023-09-07 DEVICE — AVETA SMOL RESECTING DEVICE 2.9MM: Type: IMPLANTABLE DEVICE | Status: FUNCTIONAL

## 2023-09-07 RX ORDER — OXYCODONE HYDROCHLORIDE 5 MG/1
5 TABLET ORAL ONCE
Refills: 0 | Status: DISCONTINUED | OUTPATIENT
Start: 2023-09-07 | End: 2023-09-07

## 2023-09-07 RX ORDER — LIDOCAINE HCL 20 MG/ML
0.2 VIAL (ML) INJECTION ONCE
Refills: 0 | Status: COMPLETED | OUTPATIENT
Start: 2023-09-07 | End: 2023-09-07

## 2023-09-07 RX ORDER — ONDANSETRON 8 MG/1
4 TABLET, FILM COATED ORAL ONCE
Refills: 0 | Status: DISCONTINUED | OUTPATIENT
Start: 2023-09-07 | End: 2023-09-21

## 2023-09-07 RX ADMIN — SODIUM CHLORIDE 3 MILLILITER(S): 9 INJECTION INTRAMUSCULAR; INTRAVENOUS; SUBCUTANEOUS at 12:05

## 2023-09-07 RX ADMIN — SODIUM CHLORIDE 100 MILLILITER(S): 9 INJECTION, SOLUTION INTRAVENOUS at 12:11

## 2023-09-07 NOTE — BRIEF OPERATIVE NOTE - NSICDXBRIEFPROCEDURE_GEN_ALL_CORE_FT
PROCEDURES:  Hysteroscopy with lysis of adhesions 07-Sep-2023 15:20:57  Frankel, Robyn  Polypectomy, hysteroscopic, uterus 07-Sep-2023 15:21:06  Frankel, Robyn

## 2023-09-07 NOTE — BRIEF OPERATIVE NOTE - NSICDXBRIEFPOSTOP_GEN_ALL_CORE_FT
POST-OP DIAGNOSIS:  Endometrial polyp 07-Sep-2023 15:21:23  Frankel, Robyn  Intrauterine adhesions 07-Sep-2023 15:21:58  Frankel, Robyn  
yes

## 2023-09-07 NOTE — PRE-ANESTHESIA EVALUATION ADULT - NSANTHPMHFT_GEN_ALL_CORE
Mild GERD, not on meds. Mild f\Factor 7 def, Heme note appreciated. Previous surgery w/o abnormal bleeding or need for any intervention

## 2023-09-07 NOTE — BRIEF OPERATIVE NOTE - OPERATION/FINDINGS
Patient requested Lantus Rx refill. Pended Rx and routed to PCP.     Lucius Lanes, PharmD  Clinical Pharmacist Specialist Anteverted uterus. Polypoid tissue noted throughout cavity, most prominent posteriorly and near right ostium. Dense linear scar tissue noted along fundus, questionable scar vs uterine septum, with tunnelling of the right ostium>left.

## 2023-09-07 NOTE — ASU PATIENT PROFILE, ADULT - PATIENT'S PREFERRED PRONOUN
Reason for Call:  Other call back    Detailed comments: Pt's Daughter calling for a follow up phone care regarding status of order to move Pt to a long term care facility.    Phone Number Patient can be reached at: [phone]    Best Time: anytime    Can we leave a detailed message on this number? YES    Call taken on 10/18/2018 at 1:44 PM by Sherman Frey     Her/She

## 2023-09-07 NOTE — ASU DISCHARGE PLAN (ADULT/PEDIATRIC) - CARE PROVIDER_API CALL
Yeimy Conn  Obstetrics and Gynecology  [address] [place] [address], [address] [address]  Phone: [phone]  Fax: [phone]  Established Patient  Follow Up Time: 2 weeks

## 2023-09-11 LAB — SURGICAL PATHOLOGY STUDY: SIGNIFICANT CHANGE UP

## 2023-09-19 ENCOUNTER — APPOINTMENT (OUTPATIENT)
Dept: HUMAN REPRODUCTION | Facility: CLINIC | Age: 33
End: 2023-09-19
Payer: COMMERCIAL

## 2023-09-19 PROCEDURE — 76857 US EXAM PELVIC LIMITED: CPT

## 2023-09-19 PROCEDURE — 99213 OFFICE O/P EST LOW 20 MIN: CPT | Mod: 25

## 2023-10-13 ENCOUNTER — APPOINTMENT (OUTPATIENT)
Dept: HUMAN REPRODUCTION | Facility: CLINIC | Age: 33
End: 2023-10-13
Payer: COMMERCIAL

## 2023-10-13 PROCEDURE — 99213 OFFICE O/P EST LOW 20 MIN: CPT | Mod: 25

## 2023-10-13 PROCEDURE — 76831 ECHO EXAM UTERUS: CPT

## 2023-10-13 PROCEDURE — 58340 CATHETER FOR HYSTEROGRAPHY: CPT

## 2023-10-13 PROCEDURE — 58999I: CUSTOM

## 2023-11-08 ENCOUNTER — OUTPATIENT (OUTPATIENT)
Dept: OUTPATIENT SERVICES | Facility: HOSPITAL | Age: 33
LOS: 1 days | End: 2023-11-08
Payer: COMMERCIAL

## 2023-11-08 ENCOUNTER — TRANSCRIPTION ENCOUNTER (OUTPATIENT)
Age: 33
End: 2023-11-08

## 2023-11-08 VITALS
HEART RATE: 95 BPM | WEIGHT: 136.91 LBS | OXYGEN SATURATION: 99 % | RESPIRATION RATE: 16 BRPM | SYSTOLIC BLOOD PRESSURE: 120 MMHG | TEMPERATURE: 98 F | DIASTOLIC BLOOD PRESSURE: 87 MMHG | HEIGHT: 63 IN

## 2023-11-08 DIAGNOSIS — D68.2 HEREDITARY DEFICIENCY OF OTHER CLOTTING FACTORS: ICD-10-CM

## 2023-11-08 DIAGNOSIS — N84.0 POLYP OF CORPUS UTERI: ICD-10-CM

## 2023-11-08 DIAGNOSIS — Z98.890 OTHER SPECIFIED POSTPROCEDURAL STATES: Chronic | ICD-10-CM

## 2023-11-08 DIAGNOSIS — N97.9 FEMALE INFERTILITY, UNSPECIFIED: ICD-10-CM

## 2023-11-08 LAB
APTT BLD: 33.3 SEC — SIGNIFICANT CHANGE UP (ref 24.5–35.6)
APTT BLD: 33.3 SEC — SIGNIFICANT CHANGE UP (ref 24.5–35.6)
BLD GP AB SCN SERPL QL: NEGATIVE — SIGNIFICANT CHANGE UP
BLD GP AB SCN SERPL QL: NEGATIVE — SIGNIFICANT CHANGE UP
HCT VFR BLD CALC: 42.9 % — SIGNIFICANT CHANGE UP (ref 34.5–45)
HCT VFR BLD CALC: 42.9 % — SIGNIFICANT CHANGE UP (ref 34.5–45)
HGB BLD-MCNC: 13.7 G/DL — SIGNIFICANT CHANGE UP (ref 11.5–15.5)
HGB BLD-MCNC: 13.7 G/DL — SIGNIFICANT CHANGE UP (ref 11.5–15.5)
INR BLD: 1.41 RATIO — HIGH (ref 0.85–1.18)
INR BLD: 1.41 RATIO — HIGH (ref 0.85–1.18)
MCHC RBC-ENTMCNC: 28 PG — SIGNIFICANT CHANGE UP (ref 27–34)
MCHC RBC-ENTMCNC: 28 PG — SIGNIFICANT CHANGE UP (ref 27–34)
MCHC RBC-ENTMCNC: 31.9 GM/DL — LOW (ref 32–36)
MCHC RBC-ENTMCNC: 31.9 GM/DL — LOW (ref 32–36)
MCV RBC AUTO: 87.7 FL — SIGNIFICANT CHANGE UP (ref 80–100)
MCV RBC AUTO: 87.7 FL — SIGNIFICANT CHANGE UP (ref 80–100)
NRBC # BLD: 0 /100 WBCS — SIGNIFICANT CHANGE UP (ref 0–0)
NRBC # BLD: 0 /100 WBCS — SIGNIFICANT CHANGE UP (ref 0–0)
PLATELET # BLD AUTO: 310 K/UL — SIGNIFICANT CHANGE UP (ref 150–400)
PLATELET # BLD AUTO: 310 K/UL — SIGNIFICANT CHANGE UP (ref 150–400)
PROTHROM AB SERPL-ACNC: 14.7 SEC — HIGH (ref 9.5–13)
PROTHROM AB SERPL-ACNC: 14.7 SEC — HIGH (ref 9.5–13)
RBC # BLD: 4.89 M/UL — SIGNIFICANT CHANGE UP (ref 3.8–5.2)
RBC # BLD: 4.89 M/UL — SIGNIFICANT CHANGE UP (ref 3.8–5.2)
RBC # FLD: 12.6 % — SIGNIFICANT CHANGE UP (ref 10.3–14.5)
RBC # FLD: 12.6 % — SIGNIFICANT CHANGE UP (ref 10.3–14.5)
RH IG SCN BLD-IMP: POSITIVE — SIGNIFICANT CHANGE UP
RH IG SCN BLD-IMP: POSITIVE — SIGNIFICANT CHANGE UP
WBC # BLD: 5.3 K/UL — SIGNIFICANT CHANGE UP (ref 3.8–10.5)
WBC # BLD: 5.3 K/UL — SIGNIFICANT CHANGE UP (ref 3.8–10.5)
WBC # FLD AUTO: 5.3 K/UL — SIGNIFICANT CHANGE UP (ref 3.8–10.5)
WBC # FLD AUTO: 5.3 K/UL — SIGNIFICANT CHANGE UP (ref 3.8–10.5)

## 2023-11-08 PROCEDURE — 86901 BLOOD TYPING SEROLOGIC RH(D): CPT

## 2023-11-08 PROCEDURE — 86850 RBC ANTIBODY SCREEN: CPT

## 2023-11-08 PROCEDURE — G0463: CPT

## 2023-11-08 PROCEDURE — 86900 BLOOD TYPING SEROLOGIC ABO: CPT

## 2023-11-08 PROCEDURE — 85027 COMPLETE CBC AUTOMATED: CPT

## 2023-11-08 PROCEDURE — 85610 PROTHROMBIN TIME: CPT

## 2023-11-08 PROCEDURE — 85730 THROMBOPLASTIN TIME PARTIAL: CPT

## 2023-11-08 RX ORDER — SODIUM CHLORIDE 9 MG/ML
1000 INJECTION, SOLUTION INTRAVENOUS
Refills: 0 | Status: DISCONTINUED | OUTPATIENT
Start: 2023-11-09 | End: 2023-11-23

## 2023-11-08 RX ORDER — IBUPROFEN 200 MG
1 TABLET ORAL
Qty: 0 | Refills: 0 | DISCHARGE

## 2023-11-08 RX ORDER — DESOGESTREL AND ETHINYL ESTRADIOL 0.15-0.03
1 KIT ORAL
Refills: 0 | DISCHARGE

## 2023-11-08 RX ORDER — ACETAMINOPHEN 500 MG
2 TABLET ORAL
Qty: 0 | Refills: 0 | DISCHARGE

## 2023-11-08 NOTE — H&P PST ADULT - ASSESSMENT
Airway : no airway abnormalities , denies prior anesthesia complications   Mallampati : II  Denies loose teeth    Geoff abrasion risk : Denies

## 2023-11-08 NOTE — H&P PST ADULT - PRIMARY CARE PROVIDER
Jacky Burton [phone] last visit 9/2023, Hematologist Ra Hopper [phone]  last visit 8/14/2023 note on chart

## 2023-11-08 NOTE — H&P PST ADULT - PROBLEM SELECTOR PLAN 1
D&C  hysteroscopy  polypectomy   PST Instructions provided, patient verbalized understanding   CBC, coags, T&S collected and sent

## 2023-11-08 NOTE — H&P PST ADULT - NSICDXPASTMEDICALHX_GEN_ALL_CORE_FT
PAST MEDICAL HISTORY:  Environmental asthma     Factor VII deficiency     GERD (gastroesophageal reflux disease)     History of depression     History of female infertility     History of hemorrhoids     History of irritable bowel syndrome     Irritable bowel syndrome, unspecified type     Polyp of corpus uteri     Right ovarian cyst

## 2023-11-08 NOTE — H&P PST ADULT - HISTORY OF PRESENT ILLNESS
33 yr old F with history of abnormal PT/ PTT/ INR , mild factor VII mutation, reports history of rhinoplasty 2019 , was given Novo7 prior to the surgery, s/p LEEP procedure ( 2018 ) ,  colonoscopy with polypectomy, underwent uterine polypectomy 9/7/2023 without complications and did not require treatment with factor. Denies heavy periods, denies bleeding gums , no nosebleeds, no easy bruising. Was evaluated by Dr. Ra Hopper 8/2023 . Factor VII activity at that time 33% was cleared for ovum retrieval due to problems with conceiving. Patient did not require preoperative treatment. " If bleeding dose occur postoperatively , patient can receive Novoseven  1000mcg IV q12 hr for as long as the bleeding is still evident" .  Patient planing IUI/ IVF , found to have new uterine polyps , presents to PST today for scheduled D&C , hysteroscopy , polypectomy on 11/9/2023.

## 2023-11-09 ENCOUNTER — RESULT REVIEW (OUTPATIENT)
Age: 33
End: 2023-11-09

## 2023-11-09 ENCOUNTER — OUTPATIENT (OUTPATIENT)
Dept: OUTPATIENT SERVICES | Facility: HOSPITAL | Age: 33
LOS: 1 days | End: 2023-11-09
Payer: COMMERCIAL

## 2023-11-09 ENCOUNTER — TRANSCRIPTION ENCOUNTER (OUTPATIENT)
Age: 33
End: 2023-11-09

## 2023-11-09 ENCOUNTER — APPOINTMENT (OUTPATIENT)
Dept: HUMAN REPRODUCTION | Facility: CLINIC | Age: 33
End: 2023-11-09
Payer: COMMERCIAL

## 2023-11-09 VITALS
DIASTOLIC BLOOD PRESSURE: 61 MMHG | HEART RATE: 75 BPM | OXYGEN SATURATION: 98 % | SYSTOLIC BLOOD PRESSURE: 113 MMHG | RESPIRATION RATE: 17 BRPM

## 2023-11-09 VITALS
OXYGEN SATURATION: 99 % | WEIGHT: 136.91 LBS | HEART RATE: 86 BPM | TEMPERATURE: 98 F | SYSTOLIC BLOOD PRESSURE: 107 MMHG | DIASTOLIC BLOOD PRESSURE: 74 MMHG | HEIGHT: 63 IN | RESPIRATION RATE: 16 BRPM

## 2023-11-09 DIAGNOSIS — Z98.890 OTHER SPECIFIED POSTPROCEDURAL STATES: Chronic | ICD-10-CM

## 2023-11-09 DIAGNOSIS — N97.9 FEMALE INFERTILITY, UNSPECIFIED: ICD-10-CM

## 2023-11-09 PROCEDURE — 86901 BLOOD TYPING SEROLOGIC RH(D): CPT

## 2023-11-09 PROCEDURE — 58558 HYSTEROSCOPY BIOPSY: CPT

## 2023-11-09 PROCEDURE — C1782: CPT

## 2023-11-09 PROCEDURE — 88305 TISSUE EXAM BY PATHOLOGIST: CPT

## 2023-11-09 PROCEDURE — 88305 TISSUE EXAM BY PATHOLOGIST: CPT | Mod: 26

## 2023-11-09 PROCEDURE — 86850 RBC ANTIBODY SCREEN: CPT

## 2023-11-09 PROCEDURE — 86900 BLOOD TYPING SEROLOGIC ABO: CPT

## 2023-11-09 DEVICE — AVETA FLEX RESECTING DEVICE 2.9MM: Type: IMPLANTABLE DEVICE | Status: FUNCTIONAL

## 2023-11-09 RX ORDER — FOLIC ACID 0.8 MG
1 TABLET ORAL
Refills: 0 | DISCHARGE

## 2023-11-09 RX ORDER — ACETAMINOPHEN 500 MG
1000 TABLET ORAL ONCE
Refills: 0 | Status: COMPLETED | OUTPATIENT
Start: 2023-11-09 | End: 2023-11-09

## 2023-11-09 RX ORDER — LIDOCAINE HCL 20 MG/ML
0.2 VIAL (ML) INJECTION ONCE
Refills: 0 | Status: COMPLETED | OUTPATIENT
Start: 2023-11-09 | End: 2023-11-09

## 2023-11-09 RX ADMIN — Medication 400 MILLIGRAM(S): at 15:25

## 2023-11-09 RX ADMIN — SODIUM CHLORIDE 100 MILLILITER(S): 9 INJECTION, SOLUTION INTRAVENOUS at 12:38

## 2023-11-09 NOTE — PRE-ANESTHESIA EVALUATION ADULT - NSANTHPMHFT_GEN_ALL_CORE
33F PMH mild Factor VII mutation, abnoral PT/PTT/INR (INR 1.41 today) for D&C, hysteroscopy, polypectomy. Prior surgeries such as uterine polypectoy 9/7/2023 did not require novo7 and did not have complications by bleeding.

## 2023-11-09 NOTE — ASU DISCHARGE PLAN (ADULT/PEDIATRIC) - NS MD DC FALL RISK RISK
For information on Fall & Injury Prevention, visit: [email or web address] OR  [email or web address] OR  https://www.cdc.gov/steadi/patient.html

## 2023-11-09 NOTE — ASU PATIENT PROFILE, ADULT - TEACHING/LEARNING LEARNING PREFERENCES
DATE OF PROCEDURE:  07/29/2019



This is Cornel Clifford PA-C dictating a report for Maxwell Carlson MD.



The patient is asking for a steroid injection into her left knee.  She has had one

in the past, but has not established care with any of our doctor.  She did have a

fibula fracture on the right.  She has been in a boot, but she is worried about

increasing the activity on that left lower extremity and causing some knee pain.

Evaluation of the knee, little bit swollen, but no dramatic amount of fluid, a

little bit tender to palpation, but she is able to straight leg raise and sensations

that lower extremity are intact. 



ASSESSMENT:  Left knee osteoarthritis.



PLAN:  Steroid injection.



PROCEDURE:  Connie up 40 mg of 1 mL of triamcinolone, 3 mL of 0.25% Marcaine and 1% of

3 mL of epinephrine.  Prepped the area with povidone-iodine Swabsticks x2, injected

knee, connie back no blood in syringe, injected 7 mL total.  She tolerated this well.

Band-aid applied.  No problems with injection.  We had already discussed previously

risks and benefits of the injection.  She is well-versed in having knee injections

and had no questions or concerns and was amenable to go forth with procedure. 







Job ID:  [id number] verbal instruction/written material

## 2023-11-09 NOTE — ASU DISCHARGE PLAN (ADULT/PEDIATRIC) - ASU DC SPECIAL INSTRUCTIONSFT
Postoperative Instructions      Pain control     For pain control, take the followin. Motrin 600mg four times a day (every 6 hours), take with food  2. Add Tylenol 975mg four times a day (every 6 hours), alternated with motrin  Motrin and Tylenol can be obtained over the counter.    Postoperative restrictions   Do not drive or make important decisions for 24 hours after anesthesia. You may feel drowsy for 24 hours and should have a responsible adult with you during that time. Nothing in the vagina (tampons, sexual intercourse), No tub baths, pools or hot tubs for 2 weeks (showers are ok!).    Vaginal bleeding   Spotting per vagina is normal in first few days after surgery. If you are soaking 1 pad per hour, that is not normal and you should notify your doctor's office and seek medical attention right away.        Signs of Infection  Some postoperative pain and discomfort is normal. However, if you experience any of the following, you may be developing an infection and should be seen by your doctor: pain that does not get better with the oral medications listed above, fever greater than 100.4F, foul smelling discharge coming from the surgical site, nausea and vomiting that does not stop (especially if you are unable to tolerate oral intake), or inability to urinate. If you experience any of these symptoms, call your doctor's office to be seen right away.    Follow Up  Call your doctor's office to schedule a postoperative appointment in 2 weeks if you do not already have one. The results from the procedure will be discussed with you at that time.

## 2023-11-09 NOTE — ASU DISCHARGE PLAN (ADULT/PEDIATRIC) - CARE PROVIDER_API CALL
Yeimy Cnon  Reproductive Endo/Infertility  [address], [address] [address]  Phone: [phone]  Fax: [phone]  Follow Up Time:

## 2023-11-10 PROBLEM — Z86.59 PERSONAL HISTORY OF OTHER MENTAL AND BEHAVIORAL DISORDERS: Chronic | Status: ACTIVE | Noted: 2023-11-08

## 2023-11-10 PROBLEM — Z87.19 PERSONAL HISTORY OF OTHER DISEASES OF THE DIGESTIVE SYSTEM: Chronic | Status: ACTIVE | Noted: 2023-11-08

## 2023-11-10 PROBLEM — J45.909 UNSPECIFIED ASTHMA, UNCOMPLICATED: Chronic | Status: ACTIVE | Noted: 2023-11-08

## 2023-11-10 PROBLEM — Z87.42 PERSONAL HISTORY OF OTHER DISEASES OF THE FEMALE GENITAL TRACT: Chronic | Status: ACTIVE | Noted: 2023-11-08

## 2023-11-14 LAB
SURGICAL PATHOLOGY STUDY: SIGNIFICANT CHANGE UP
SURGICAL PATHOLOGY STUDY: SIGNIFICANT CHANGE UP

## 2023-11-22 ENCOUNTER — APPOINTMENT (OUTPATIENT)
Dept: HUMAN REPRODUCTION | Facility: CLINIC | Age: 33
End: 2023-11-22
Payer: COMMERCIAL

## 2023-11-22 PROCEDURE — 76857 US EXAM PELVIC LIMITED: CPT

## 2023-11-22 PROCEDURE — 99213 OFFICE O/P EST LOW 20 MIN: CPT | Mod: 25

## 2023-12-01 ENCOUNTER — APPOINTMENT (OUTPATIENT)
Dept: HUMAN REPRODUCTION | Facility: CLINIC | Age: 33
End: 2023-12-01
Payer: COMMERCIAL

## 2023-12-01 PROCEDURE — 99215 OFFICE O/P EST HI 40 MIN: CPT | Mod: 95

## 2023-12-04 ENCOUNTER — APPOINTMENT (OUTPATIENT)
Dept: HUMAN REPRODUCTION | Facility: CLINIC | Age: 33
End: 2023-12-04
Payer: COMMERCIAL

## 2023-12-04 PROCEDURE — 76857 US EXAM PELVIC LIMITED: CPT

## 2023-12-04 PROCEDURE — 99213 OFFICE O/P EST LOW 20 MIN: CPT | Mod: 25

## 2023-12-04 PROCEDURE — 36415 COLL VENOUS BLD VENIPUNCTURE: CPT

## 2023-12-05 ENCOUNTER — APPOINTMENT (OUTPATIENT)
Dept: HUMAN REPRODUCTION | Facility: CLINIC | Age: 33
End: 2023-12-05

## 2023-12-06 ENCOUNTER — APPOINTMENT (OUTPATIENT)
Dept: HUMAN REPRODUCTION | Facility: CLINIC | Age: 33
End: 2023-12-06
Payer: COMMERCIAL

## 2023-12-06 PROCEDURE — 76857 US EXAM PELVIC LIMITED: CPT

## 2023-12-06 PROCEDURE — 36415 COLL VENOUS BLD VENIPUNCTURE: CPT

## 2023-12-06 PROCEDURE — 99213 OFFICE O/P EST LOW 20 MIN: CPT | Mod: 25

## 2023-12-08 ENCOUNTER — APPOINTMENT (OUTPATIENT)
Dept: HUMAN REPRODUCTION | Facility: CLINIC | Age: 33
End: 2023-12-08
Payer: COMMERCIAL

## 2023-12-08 PROCEDURE — 99213 OFFICE O/P EST LOW 20 MIN: CPT | Mod: 25

## 2023-12-08 PROCEDURE — 76857 US EXAM PELVIC LIMITED: CPT

## 2023-12-08 PROCEDURE — 36415 COLL VENOUS BLD VENIPUNCTURE: CPT

## 2023-12-09 ENCOUNTER — APPOINTMENT (OUTPATIENT)
Dept: HUMAN REPRODUCTION | Facility: CLINIC | Age: 33
End: 2023-12-09
Payer: COMMERCIAL

## 2023-12-09 PROCEDURE — 58322 ARTIFICIAL INSEMINATION: CPT

## 2023-12-09 PROCEDURE — 99213 OFFICE O/P EST LOW 20 MIN: CPT | Mod: 25

## 2023-12-09 PROCEDURE — 89260 SPERM ISOLATION SIMPLE: CPT

## 2023-12-26 ENCOUNTER — APPOINTMENT (OUTPATIENT)
Dept: HUMAN REPRODUCTION | Facility: CLINIC | Age: 33
End: 2023-12-26
Payer: COMMERCIAL

## 2023-12-26 PROCEDURE — 76857 US EXAM PELVIC LIMITED: CPT

## 2023-12-26 PROCEDURE — 36415 COLL VENOUS BLD VENIPUNCTURE: CPT

## 2023-12-26 PROCEDURE — 99213 OFFICE O/P EST LOW 20 MIN: CPT | Mod: 25

## 2023-12-27 ENCOUNTER — TRANSCRIPTION ENCOUNTER (OUTPATIENT)
Age: 33
End: 2023-12-27

## 2024-01-02 ENCOUNTER — APPOINTMENT (OUTPATIENT)
Dept: HUMAN REPRODUCTION | Facility: CLINIC | Age: 34
End: 2024-01-02
Payer: COMMERCIAL

## 2024-01-02 PROCEDURE — 36415 COLL VENOUS BLD VENIPUNCTURE: CPT

## 2024-01-02 PROCEDURE — 99213 OFFICE O/P EST LOW 20 MIN: CPT | Mod: 25

## 2024-01-02 PROCEDURE — 76857 US EXAM PELVIC LIMITED: CPT

## 2024-01-03 ENCOUNTER — APPOINTMENT (OUTPATIENT)
Dept: HUMAN REPRODUCTION | Facility: CLINIC | Age: 34
End: 2024-01-03
Payer: COMMERCIAL

## 2024-01-03 PROCEDURE — 58322 ARTIFICIAL INSEMINATION: CPT

## 2024-01-03 PROCEDURE — 89260 SPERM ISOLATION SIMPLE: CPT

## 2024-01-17 ENCOUNTER — APPOINTMENT (OUTPATIENT)
Dept: HUMAN REPRODUCTION | Facility: CLINIC | Age: 34
End: 2024-01-17
Payer: COMMERCIAL

## 2024-01-17 PROCEDURE — S4042: CPT

## 2024-01-17 PROCEDURE — 36415 COLL VENOUS BLD VENIPUNCTURE: CPT

## 2024-01-17 PROCEDURE — 76830 TRANSVAGINAL US NON-OB: CPT

## 2024-01-17 PROCEDURE — 99213 OFFICE O/P EST LOW 20 MIN: CPT | Mod: 25

## 2024-01-25 ENCOUNTER — APPOINTMENT (OUTPATIENT)
Dept: HUMAN REPRODUCTION | Facility: CLINIC | Age: 34
End: 2024-01-25
Payer: COMMERCIAL

## 2024-01-25 PROCEDURE — 36415 COLL VENOUS BLD VENIPUNCTURE: CPT

## 2024-01-25 PROCEDURE — 76857 US EXAM PELVIC LIMITED: CPT

## 2024-01-25 PROCEDURE — 99213 OFFICE O/P EST LOW 20 MIN: CPT | Mod: 25

## 2024-01-27 ENCOUNTER — APPOINTMENT (OUTPATIENT)
Dept: HUMAN REPRODUCTION | Facility: CLINIC | Age: 34
End: 2024-01-27
Payer: COMMERCIAL

## 2024-01-27 PROCEDURE — 36415 COLL VENOUS BLD VENIPUNCTURE: CPT

## 2024-01-27 PROCEDURE — 99213 OFFICE O/P EST LOW 20 MIN: CPT | Mod: 25

## 2024-01-27 PROCEDURE — 76857 US EXAM PELVIC LIMITED: CPT

## 2024-01-28 ENCOUNTER — APPOINTMENT (OUTPATIENT)
Dept: HUMAN REPRODUCTION | Facility: CLINIC | Age: 34
End: 2024-01-28
Payer: COMMERCIAL

## 2024-01-28 PROCEDURE — 89261 SPERM ISOLATION COMPLEX: CPT

## 2024-01-28 PROCEDURE — 58322 ARTIFICIAL INSEMINATION: CPT

## 2024-01-28 PROCEDURE — 99213 OFFICE O/P EST LOW 20 MIN: CPT | Mod: 25

## 2024-02-15 ENCOUNTER — APPOINTMENT (OUTPATIENT)
Dept: HUMAN REPRODUCTION | Facility: CLINIC | Age: 34
End: 2024-02-15
Payer: COMMERCIAL

## 2024-02-15 PROCEDURE — 36415 COLL VENOUS BLD VENIPUNCTURE: CPT

## 2024-02-15 PROCEDURE — 76857 US EXAM PELVIC LIMITED: CPT

## 2024-02-15 PROCEDURE — 99213 OFFICE O/P EST LOW 20 MIN: CPT | Mod: 25

## 2024-02-20 ENCOUNTER — APPOINTMENT (OUTPATIENT)
Dept: HUMAN REPRODUCTION | Facility: CLINIC | Age: 34
End: 2024-02-20

## 2024-03-27 ENCOUNTER — APPOINTMENT (OUTPATIENT)
Dept: HUMAN REPRODUCTION | Facility: CLINIC | Age: 34
End: 2024-03-27
Payer: COMMERCIAL

## 2024-03-27 PROCEDURE — 36415 COLL VENOUS BLD VENIPUNCTURE: CPT

## 2024-03-27 PROCEDURE — 99215 OFFICE O/P EST HI 40 MIN: CPT

## 2024-04-16 NOTE — ED PROVIDER NOTE - DETAILS:
General: Awake, alert, lying in bed in NAD  HEENT: Normocephalic, atraumatic. No scleral icterus or conjunctival injection. EOMI. Moist mucous membranes. Oropharynx clear.   Neck:. Soft and supple.  Cardiac: tachycardic, no mururs. Peripheral pulses 2+ and symmetric. No LE edema.  Resp: Lungs CTAB. No accessory muscle use  Abd: Soft, non-tender, non-distended. No guarding, rebound, or rigidity.  Back: Spine midline and non-tender.   Skin: No rashes, abrasions, or lacerations.  Neuro: AO x 4. Moves all extremities symmetrically. Motor strength and sensation grossly intact.  Psych: Appropriate mood and affect
I performed the initial face to face bedside interview with this patient regarding history of present illness, review of symptoms and past medical, social and family history.  I completed an independent physical examination.  I was the initial provider who evaluated this patient.  The history, review of symptoms and examination was documented by the scribe in my presence and I attest to the accuracy of the documentation.  I have signed out the follow up of any pending tests (i.e. labs, radiological studies) to the PA.  I have discussed the patient’s plan of care and disposition with the PA.

## 2024-05-02 ENCOUNTER — APPOINTMENT (OUTPATIENT)
Dept: HUMAN REPRODUCTION | Facility: CLINIC | Age: 34
End: 2024-05-02

## 2024-05-23 ENCOUNTER — APPOINTMENT (OUTPATIENT)
Dept: OBGYN | Facility: CLINIC | Age: 34
End: 2024-05-23
Payer: COMMERCIAL

## 2024-05-23 VITALS
DIASTOLIC BLOOD PRESSURE: 89 MMHG | BODY MASS INDEX: 21.44 KG/M2 | OXYGEN SATURATION: 99 % | SYSTOLIC BLOOD PRESSURE: 117 MMHG | HEART RATE: 99 BPM | HEIGHT: 63 IN | WEIGHT: 121 LBS

## 2024-05-23 DIAGNOSIS — N89.8 OTHER SPECIFIED NONINFLAMMATORY DISORDERS OF VAGINA: ICD-10-CM

## 2024-05-23 DIAGNOSIS — Z11.3 ENCOUNTER FOR SCREENING FOR INFECTIONS WITH A PREDOMINANTLY SEXUAL MODE OF TRANSMISSION: ICD-10-CM

## 2024-05-23 PROCEDURE — 99213 OFFICE O/P EST LOW 20 MIN: CPT

## 2024-05-23 PROCEDURE — 36415 COLL VENOUS BLD VENIPUNCTURE: CPT

## 2024-05-23 NOTE — PLAN
[FreeTextEntry1] : STI screening -bloods panel and gcc, aptima vaginitis collected and sent today -normal exam -condom use -emotional support provided, pt tearful speaking about her divorce.   rto 7 /2024 for annual
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
## 2024-05-23 NOTE — PHYSICAL EXAM
[Chaperone Declined] : Patient declined chaperone [Appropriately responsive] : appropriately responsive [Alert] : alert [No Acute Distress] : no acute distress [No Lymphadenopathy] : no lymphadenopathy [Soft] : soft [Non-tender] : non-tender [Non-distended] : non-distended [No HSM] : No HSM [No Lesions] : no lesions [No Mass] : no mass [Oriented x3] : oriented x3 [Tenderness] : nontender

## 2024-05-24 LAB
BV BACTERIA RRNA VAG QL NAA+PROBE: NOT DETECTED
C GLABRATA RNA VAG QL NAA+PROBE: NOT DETECTED
C TRACH RRNA SPEC QL NAA+PROBE: NOT DETECTED
CANDIDA RRNA VAG QL PROBE: NOT DETECTED
HIV1+2 AB SPEC QL IA.RAPID: NONREACTIVE
N GONORRHOEA RRNA SPEC QL NAA+PROBE: NOT DETECTED
T PALLIDUM AB SER QL IA: NEGATIVE
T VAGINALIS RRNA SPEC QL NAA+PROBE: NOT DETECTED

## 2024-05-28 LAB
HBV SURFACE AG SER QL: NONREACTIVE
HCV AB SER QL: NONREACTIVE
HCV S/CO RATIO: 0.1 S/CO

## 2024-06-18 ENCOUNTER — APPOINTMENT (OUTPATIENT)
Dept: HUMAN REPRODUCTION | Facility: CLINIC | Age: 34
End: 2024-06-18
Payer: COMMERCIAL

## 2024-06-18 PROCEDURE — 99213 OFFICE O/P EST LOW 20 MIN: CPT | Mod: 25

## 2024-06-18 PROCEDURE — 36415 COLL VENOUS BLD VENIPUNCTURE: CPT

## 2024-06-18 PROCEDURE — 76857 US EXAM PELVIC LIMITED: CPT

## 2024-06-20 ENCOUNTER — APPOINTMENT (OUTPATIENT)
Dept: GASTROENTEROLOGY | Facility: CLINIC | Age: 34
End: 2024-06-20

## 2024-07-16 ENCOUNTER — APPOINTMENT (OUTPATIENT)
Dept: HUMAN REPRODUCTION | Facility: CLINIC | Age: 34
End: 2024-07-16
Payer: COMMERCIAL

## 2024-07-16 PROCEDURE — 99214 OFFICE O/P EST MOD 30 MIN: CPT

## 2024-07-18 ENCOUNTER — OUTPATIENT (OUTPATIENT)
Dept: OUTPATIENT SERVICES | Facility: HOSPITAL | Age: 34
LOS: 1 days | Discharge: HOME | End: 2024-07-18

## 2024-07-18 DIAGNOSIS — Z98.890 OTHER SPECIFIED POSTPROCEDURAL STATES: Chronic | ICD-10-CM

## 2024-07-18 DIAGNOSIS — R79.1 ABNORMAL COAGULATION PROFILE: ICD-10-CM

## 2024-07-19 ENCOUNTER — APPOINTMENT (OUTPATIENT)
Dept: OBGYN | Facility: CLINIC | Age: 34
End: 2024-07-19
Payer: COMMERCIAL

## 2024-07-19 VITALS
WEIGHT: 126.75 LBS | BODY MASS INDEX: 22.46 KG/M2 | SYSTOLIC BLOOD PRESSURE: 116 MMHG | HEIGHT: 63 IN | DIASTOLIC BLOOD PRESSURE: 73 MMHG

## 2024-07-19 PROCEDURE — 99213 OFFICE O/P EST LOW 20 MIN: CPT

## 2024-07-22 DIAGNOSIS — R39.9 UNSPECIFIED SYMPTOMS AND SIGNS INVOLVING THE GENITOURINARY SYSTEM: ICD-10-CM

## 2024-07-23 ENCOUNTER — APPOINTMENT (OUTPATIENT)
Dept: HEMATOLOGY ONCOLOGY | Facility: CLINIC | Age: 34
End: 2024-07-23
Payer: COMMERCIAL

## 2024-07-23 ENCOUNTER — RESULT REVIEW (OUTPATIENT)
Age: 34
End: 2024-07-23

## 2024-07-23 VITALS
RESPIRATION RATE: 16 BRPM | OXYGEN SATURATION: 97 % | WEIGHT: 121.25 LBS | TEMPERATURE: 97.9 F | BODY MASS INDEX: 21.48 KG/M2 | DIASTOLIC BLOOD PRESSURE: 86 MMHG | SYSTOLIC BLOOD PRESSURE: 121 MMHG | HEART RATE: 92 BPM

## 2024-07-23 DIAGNOSIS — B37.9 CANDIDIASIS, UNSPECIFIED: ICD-10-CM

## 2024-07-23 LAB
BASOPHILS # BLD AUTO: 0.02 K/UL — SIGNIFICANT CHANGE UP (ref 0–0.2)
BASOPHILS NFR BLD AUTO: 0.2 % — SIGNIFICANT CHANGE UP (ref 0–2)
EOSINOPHIL # BLD AUTO: 0.03 K/UL — SIGNIFICANT CHANGE UP (ref 0–0.5)
EOSINOPHIL NFR BLD AUTO: 0.4 % — SIGNIFICANT CHANGE UP (ref 0–6)
FERRITIN SERPL-MCNC: 30 NG/ML
HCT VFR BLD CALC: 43.8 % — SIGNIFICANT CHANGE UP (ref 34.5–45)
HGB BLD-MCNC: 14.6 G/DL — SIGNIFICANT CHANGE UP (ref 11.5–15.5)
IMM GRANULOCYTES NFR BLD AUTO: 0.4 % — SIGNIFICANT CHANGE UP (ref 0–0.9)
IRON SATN MFR SERPL: 18 %
IRON SERPL-MCNC: 69 UG/DL
LYMPHOCYTES # BLD AUTO: 1.81 K/UL — SIGNIFICANT CHANGE UP (ref 1–3.3)
LYMPHOCYTES # BLD AUTO: 21.4 % — SIGNIFICANT CHANGE UP (ref 13–44)
MCHC RBC-ENTMCNC: 29.4 PG — SIGNIFICANT CHANGE UP (ref 27–34)
MCHC RBC-ENTMCNC: 33.3 G/DL — SIGNIFICANT CHANGE UP (ref 32–36)
MCV RBC AUTO: 88.1 FL — SIGNIFICANT CHANGE UP (ref 80–100)
MONOCYTES # BLD AUTO: 0.42 K/UL — SIGNIFICANT CHANGE UP (ref 0–0.9)
MONOCYTES NFR BLD AUTO: 5 % — SIGNIFICANT CHANGE UP (ref 2–14)
NEUTROPHILS # BLD AUTO: 6.16 K/UL — SIGNIFICANT CHANGE UP (ref 1.8–7.4)
NEUTROPHILS NFR BLD AUTO: 72.6 % — SIGNIFICANT CHANGE UP (ref 43–77)
NRBC # BLD: 0 /100 WBCS — SIGNIFICANT CHANGE UP (ref 0–0)
PLATELET # BLD AUTO: 319 K/UL — SIGNIFICANT CHANGE UP (ref 150–400)
RBC # BLD: 4.97 M/UL — SIGNIFICANT CHANGE UP (ref 3.8–5.2)
RBC # FLD: 12.6 % — SIGNIFICANT CHANGE UP (ref 10.3–14.5)
TIBC SERPL-MCNC: 371 UG/DL
UIBC SERPL-MCNC: 303 UG/DL
WBC # BLD: 8.47 K/UL — SIGNIFICANT CHANGE UP (ref 3.8–10.5)
WBC # FLD AUTO: 8.47 K/UL — SIGNIFICANT CHANGE UP (ref 3.8–10.5)

## 2024-07-23 PROCEDURE — 99214 OFFICE O/P EST MOD 30 MIN: CPT

## 2024-07-23 RX ORDER — METRONIDAZOLE 7.5 MG/G
0.75 GEL VAGINAL
Qty: 1 | Refills: 0 | Status: ACTIVE | COMMUNITY
Start: 2024-07-23 | End: 1900-01-01

## 2024-07-23 RX ORDER — FLUCONAZOLE 150 MG/1
150 TABLET ORAL
Qty: 2 | Refills: 0 | Status: ACTIVE | COMMUNITY
Start: 2024-07-23 | End: 1900-01-01

## 2024-07-24 LAB
APTT BLD: 33.9 SEC
BV BACTERIA RRNA VAG QL NAA+PROBE: DETECTED
C GLABRATA RNA VAG QL NAA+PROBE: NOT DETECTED
C TRACH RRNA SPEC QL NAA+PROBE: NOT DETECTED
CANDIDA RRNA VAG QL PROBE: DETECTED
FACT VII ACT/NOR PPP: 31 %
INR PPP: 1.3 RATIO
N GONORRHOEA RRNA SPEC QL NAA+PROBE: NOT DETECTED
PT BLD: 14.7 SEC
T VAGINALIS RRNA SPEC QL NAA+PROBE: NOT DETECTED

## 2024-07-26 NOTE — ASSESSMENT
[FreeTextEntry1] : Ms. London is a 32yo F known to me for a history of high Pt/INR - diagnosed with mild factor VII deficiency. She was last seen in my office on 5/7/18 for hematological clearance for LEEP, which is scheduled for 5/30/18.  During this procedure, patient did not require treatment with factor.  Did not experience excessive bleeding after this.  Since last visit, patient has had a colonoscopy with polypectomy without any bleeding complications. She denied any nose bleeds/spontaneous bruising, and has had no melena/BRBPR.  Given patient's factor VII level is above 20%, and given that she has not had bleeding post-invasive procedures, she is hematologicaly cleared for LEEP procedure without any prophylactic treatment. .Since then she proceeded to a rhinoplasty for which she was treated with novo7 prophylactically, and this was not complicated by post operative bleeding.   Patient no longer seeking fertility procedure, she is now interested in cosmetic procedures particularly liposuction or a breast augmentation.  The major obstacle in proceeding to a liposuction in the setting of a factor VII deficiency is that the factor VII has a short half life.  Any FFP or Novoseven administered would only last 3-4 hours maximum making it difficult for he to receive the factor replacement in a timeframe to make the procedure feasible in an outpatient setting.  It would be more feasible to perform this in a hospital OR where the factor can be administered on call to procedure. This may be feasible for the breast augmentation or if the liposuction is done in a hospital OR. Repeat labs 7/24/24 PT 14.7 sec INR 1.3 Factor VII activity 31%.  Platelets normal at 319. aPTT normal at 33.9 sec.    Asked her to get finalized plans on the procedure and then we can proceed to issue more specific recommendations

## 2024-07-26 NOTE — HISTORY OF PRESENT ILLNESS
[de-identified] : 34 year old woman with a history of heterozygous Factor VII mutation.   Levels in excess of 20% had not required factor or FFP for procedures in the past.   Ms. London was referred to my office after a [facility] ED visit on 2/6/17 for epistaxis and elevated PT. The patient has been having nose bleeds for this past month lasting between 10 min to 1 hour, from L nostril. She has had 3 cauterizations by ENT for it, most recently on 2/2/17. She reports no recent trauma to nose. She has had intermittent epistaxis in the past, but not to this extent. No bruising history, no FHx of bleeding (has 1 older brother). She has regular menses, lasting for about 7 days, 3 pads/day, not heavy, no blood clots.   Factor VII activty 20-27%.    Patient had a leep procedure, no factor administered no bleeding, then had a rhinoplasty following this, patient was treated with Novo7 leading up to the surgery, with this patient did not experience much post surgical bleeding after rhinoplasty.    patient has had some slight epistaxis since then.  They do stop bleeding after a short while during the winter.   Otherwise normal bruises and cuts have a normal clinical course.   Her last menstrual period was more heavy, but otherwise menses were normal.   She now presents for hematologic evaluation prior to an ovum retrieval with Dr. Yeimy Conn.   [de-identified] : Patient undergoing a divorce, no longer seeking fertility procedures. Patient had been going to the gym, not seeing results that she had anticipated. Patient considering liposuction.   Hx of factor VII deficiency.    Patient had a rhinoplasty and was ok with no major bleeding.  Had some nosebleeds in the past but none recently.  Patient had take been taking pheazothyprine for UTI.    Plastic surgeon Dr. Madhav Benavides.

## 2024-07-26 NOTE — HISTORY OF PRESENT ILLNESS
[de-identified] : 34 year old woman with a history of heterozygous Factor VII mutation.   Levels in excess of 20% had not required factor or FFP for procedures in the past.   Ms. London was referred to my office after a [facility] ED visit on 2/6/17 for epistaxis and elevated PT. The patient has been having nose bleeds for this past month lasting between 10 min to 1 hour, from L nostril. She has had 3 cauterizations by ENT for it, most recently on 2/2/17. She reports no recent trauma to nose. She has had intermittent epistaxis in the past, but not to this extent. No bruising history, no FHx of bleeding (has 1 older brother). She has regular menses, lasting for about 7 days, 3 pads/day, not heavy, no blood clots.   Factor VII activty 20-27%.    Patient had a leep procedure, no factor administered no bleeding, then had a rhinoplasty following this, patient was treated with Novo7 leading up to the surgery, with this patient did not experience much post surgical bleeding after rhinoplasty.    patient has had some slight epistaxis since then.  They do stop bleeding after a short while during the winter.   Otherwise normal bruises and cuts have a normal clinical course.   Her last menstrual period was more heavy, but otherwise menses were normal.   She now presents for hematologic evaluation prior to an ovum retrieval with Dr. Yeimy Conn.   [de-identified] : Patient undergoing a divorce, no longer seeking fertility procedures. Patient had been going to the gym, not seeing results that she had anticipated. Patient considering liposuction.   Hx of factor VII deficiency.    Patient had a rhinoplasty and was ok with no major bleeding.  Had some nosebleeds in the past but none recently.  Patient had take been taking pheazothyprine for UTI.    Plastic surgeon Dr. Madhav Benavides.

## 2024-07-26 NOTE — ASSESSMENT
[FreeTextEntry1] : Ms. London is a 34yo F known to me for a history of high Pt/INR - diagnosed with mild factor VII deficiency. She was last seen in my office on 5/7/18 for hematological clearance for LEEP, which is scheduled for 5/30/18.  During this procedure, patient did not require treatment with factor.  Did not experience excessive bleeding after this.  Since last visit, patient has had a colonoscopy with polypectomy without any bleeding complications. She denied any nose bleeds/spontaneous bruising, and has had no melena/BRBPR.  Given patient's factor VII level is above 20%, and given that she has not had bleeding post-invasive procedures, she is hematologicaly cleared for LEEP procedure without any prophylactic treatment. .Since then she proceeded to a rhinoplasty for which she was treated with novo7 prophylactically, and this was not complicated by post operative bleeding.   Patient no longer seeking fertility procedure, she is now interested in cosmetic procedures particularly liposuction or a breast augmentation.  The major obstacle in proceeding to a liposuction in the setting of a factor VII deficiency is that the factor VII has a short half life.  Any FFP or Novoseven administered would only last 3-4 hours maximum making it difficult for he to receive the factor replacement in a timeframe to make the procedure feasible in an outpatient setting.  It would be more feasible to perform this in a hospital OR where the factor can be administered on call to procedure. This may be feasible for the breast augmentation or if the liposuction is done in a hospital OR. Repeat labs 7/24/24 PT 14.7 sec INR 1.3 Factor VII activity 31%.  Platelets normal at 319. aPTT normal at 33.9 sec.    Asked her to get finalized plans on the procedure and then we can proceed to issue more specific recommendations

## 2024-07-28 ENCOUNTER — NON-APPOINTMENT (OUTPATIENT)
Age: 34
End: 2024-07-28

## 2024-07-30 ENCOUNTER — NON-APPOINTMENT (OUTPATIENT)
Age: 34
End: 2024-07-30

## 2024-08-01 ENCOUNTER — APPOINTMENT (OUTPATIENT)
Dept: OBGYN | Facility: CLINIC | Age: 34
End: 2024-08-01
Payer: COMMERCIAL

## 2024-08-01 VITALS
SYSTOLIC BLOOD PRESSURE: 114 MMHG | WEIGHT: 122 LBS | HEIGHT: 63 IN | BODY MASS INDEX: 21.62 KG/M2 | DIASTOLIC BLOOD PRESSURE: 82 MMHG

## 2024-08-01 DIAGNOSIS — N89.8 OTHER SPECIFIED NONINFLAMMATORY DISORDERS OF VAGINA: ICD-10-CM

## 2024-08-01 DIAGNOSIS — R30.0 DYSURIA: ICD-10-CM

## 2024-08-01 DIAGNOSIS — N76.1 SUBACUTE AND CHRONIC VAGINITIS: ICD-10-CM

## 2024-08-01 DIAGNOSIS — N94.89 OTHER SPECIFIED CONDITIONS ASSOCIATED WITH FEMALE GENITAL ORGANS AND MENSTRUAL CYCLE: ICD-10-CM

## 2024-08-01 DIAGNOSIS — Z11.3 ENCOUNTER FOR SCREENING FOR INFECTIONS WITH A PREDOMINANTLY SEXUAL MODE OF TRANSMISSION: ICD-10-CM

## 2024-08-01 PROCEDURE — 81025 URINE PREGNANCY TEST: CPT

## 2024-08-01 PROCEDURE — 36415 COLL VENOUS BLD VENIPUNCTURE: CPT

## 2024-08-01 PROCEDURE — 99214 OFFICE O/P EST MOD 30 MIN: CPT

## 2024-08-01 RX ORDER — CLOTRIMAZOLE AND BETAMETHASONE DIPROPIONATE 10; .5 MG/G; MG/G
1-0.05 CREAM TOPICAL 3 TIMES DAILY
Qty: 1 | Refills: 0 | Status: ACTIVE | COMMUNITY
Start: 2024-08-01 | End: 1900-01-01

## 2024-08-01 NOTE — REVIEW OF SYSTEMS
[Patient Intake Form Reviewed] : Patient intake form was reviewed [Dysuria] : dysuria [Abn Vaginal bleeding] : abnormal vaginal bleeding [Genital Rash/Irritation] : genital rash/irritation [Negative] : Heme/Lymph

## 2024-08-01 NOTE — PLAN
[FreeTextEntry1] : Vaginitis - Strategies to decrease vaginitis symptoms and recurrence discussed. Wear cotton underwear, loose fitting closes and avoid panty hose.  Avoid hot tubs, spas and douching as well as hygiene sprays, fragrances and powders in the genital area.  Use pads instead of tampons while you have an infection.  Appropriate front to back cleaning after using the toilet. -rx lotrisone externally for discomfort -aptima/gcc and mycoplasma/ ureaplasma collected and sent today to r/o infection  Unprotected Mildred -Full STI screening done -ucg negative today at visit -condom use stressed -pelvic rest until symptoms resolve/treatment is complete  Dysuria -bladder hygiene reviewed -increase po intake -ucx collected and sent today  rto 1 mo for annual. advised pt to call Monday for results.

## 2024-08-01 NOTE — REASON FOR VISIT
[Acute] : an acute visit for [Urinary Complaints] : urinary complaints [Vulvar/Vaginal Complaint] : vulvar/vaginal complaint [STI Concerns] : STI Concerns
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
## 2024-08-01 NOTE — PHYSICAL EXAM
[Chaperone Declined] : Patient declined chaperone [Appropriately responsive] : appropriately responsive [Alert] : alert [No Acute Distress] : no acute distress [No Lymphadenopathy] : no lymphadenopathy [Soft] : soft [Non-tender] : non-tender [Non-distended] : non-distended [No HSM] : No HSM [No Lesions] : no lesions [No Mass] : no mass [Oriented x3] : oriented x3 [Labia Majora] : normal [Labia Minora] : normal [Discharge] : a  ~M vaginal discharge was present [Moderate] : moderate [Foul Smelling] : not foul smelling [Brown] : brown [Thick] : thick [Blood-Tinged] : blood-tinged [Old Blood] : old blood was present in the vagina [Normal] : normal [Tenderness] : nontender [Uterine Adnexae] : non-palpable

## 2024-08-01 NOTE — COUNSELING
[Nutrition/ Exercise/ Weight Management] : nutrition, exercise, weight management [Vitamins/Supplements] : vitamins/supplements [Breast Self Exam] : breast self exam [Bladder Hygiene] : bladder hygiene [Contraception/ Emergency Contraception/ Safe Sexual Practices] : contraception, emergency contraception, safe sexual practices [Confidentiality] : confidentiality [STD (testing, results, tx)] : STD (testing, results, tx) [HPV Vaccine] : HPV Vaccine [Lab Results] : lab results [Medication Management] : medication management

## 2024-08-02 LAB
HIV1+2 AB SPEC QL IA.RAPID: NONREACTIVE
T PALLIDUM AB SER QL IA: NEGATIVE

## 2024-08-07 LAB
BV BACTERIA RRNA VAG QL NAA+PROBE: NOT DETECTED
C GLABRATA RNA VAG QL NAA+PROBE: NOT DETECTED
C TRACH RRNA SPEC QL NAA+PROBE: NOT DETECTED
CANDIDA RRNA VAG QL PROBE: NOT DETECTED
HBV SURFACE AG SER QL: NONREACTIVE
HCV AB SER QL: NONREACTIVE
HCV S/CO RATIO: 0.11 S/CO
N GONORRHOEA RRNA SPEC QL NAA+PROBE: NOT DETECTED
T VAGINALIS RRNA SPEC QL NAA+PROBE: NOT DETECTED

## 2024-08-14 ENCOUNTER — TRANSCRIPTION ENCOUNTER (OUTPATIENT)
Age: 34
End: 2024-08-14

## 2024-08-15 ENCOUNTER — APPOINTMENT (OUTPATIENT)
Dept: OBGYN | Facility: CLINIC | Age: 34
End: 2024-08-15

## 2024-08-21 ENCOUNTER — APPOINTMENT (OUTPATIENT)
Dept: OBGYN | Facility: CLINIC | Age: 34
End: 2024-08-21
Payer: COMMERCIAL

## 2024-08-21 VITALS
HEIGHT: 63 IN | HEART RATE: 105 BPM | SYSTOLIC BLOOD PRESSURE: 111 MMHG | WEIGHT: 120 LBS | DIASTOLIC BLOOD PRESSURE: 80 MMHG | BODY MASS INDEX: 21.26 KG/M2

## 2024-08-21 DIAGNOSIS — Z01.419 ENCOUNTER FOR GYNECOLOGICAL EXAMINATION (GENERAL) (ROUTINE) W/OUT ABNORMAL FINDINGS: ICD-10-CM

## 2024-08-21 DIAGNOSIS — Z12.4 ENCOUNTER FOR SCREENING FOR MALIGNANT NEOPLASM OF CERVIX: ICD-10-CM

## 2024-08-21 DIAGNOSIS — Z11.3 ENCOUNTER FOR SCREENING FOR INFECTIONS WITH A PREDOMINANTLY SEXUAL MODE OF TRANSMISSION: ICD-10-CM

## 2024-08-21 DIAGNOSIS — Z11.51 ENCOUNTER FOR SCREENING FOR HUMAN PAPILLOMAVIRUS (HPV): ICD-10-CM

## 2024-08-21 DIAGNOSIS — R30.0 DYSURIA: ICD-10-CM

## 2024-08-21 PROCEDURE — 99395 PREV VISIT EST AGE 18-39: CPT

## 2024-08-21 PROCEDURE — 36415 COLL VENOUS BLD VENIPUNCTURE: CPT

## 2024-08-21 NOTE — COUNSELING
[Nutrition/ Exercise/ Weight Management] : nutrition, exercise, weight management [Vitamins/Supplements] : vitamins/supplements [Breast Self Exam] : breast self exam [Bladder Hygiene] : bladder hygiene [Contraception/ Emergency Contraception/ Safe Sexual Practices] : contraception, emergency contraception, safe sexual practices [Confidentiality] : confidentiality [STD (testing, results, tx)] : STD (testing, results, tx) [HPV Vaccine] : HPV Vaccine [Lab Results] : lab results

## 2024-08-21 NOTE — PLAN
[FreeTextEntry1] : Routine GYN Exam -Discussed and reviewed importance of monthly BSE -Requesting STI testing, importance safe sexual practices discussed; condom use -Pap/HPV test collected and sent at todays visit -f/u PCP for recommended HCM, vaccinations and CA screening - Offered and Declines contraceptive options -ucx per pt request today  rto 1 yr or sooner as needed.

## 2024-08-21 NOTE — HISTORY OF PRESENT ILLNESS
[FreeTextEntry1] : 33 y/o G0  LMP 8/1/24 presents for annual GYN exam. No longer having vaginal irriatation and dicharge. Menses q3 weeks apart. Requesting STI screening and urine cx (Ureaplasma/mycoplasma specimen collected at last visit but never sent to lab).   Currently going through divorce, no longer seeking Fertility care.   1 new sexual partner, using condoms  s/p Gardasil  Denies Tobacco use  Denies fmhx GYN CA   [Patient reported PAP Smear was normal] : Patient reported PAP Smear was normal [PapSmeardate] : 8/1/24 [No] : Patient does not have concerns regarding sex [Previously active] : previously active [Yes] : Yes [Condoms] : Condoms [Patient would like to be screened for STIs] : Patient would like to be screened for STIs

## 2024-08-21 NOTE — PROCEDURE
[Cervical Pap Smear] : cervical Pap smear [Liquid Base] : liquid base [GC Chlamydia Culture] : GC Chlamydia Culture [Affirm (Triple Culture)] : Affirm (triple culture) [Tolerated Well] : the patient tolerated the procedure well [No Complications] : there were no complications [de-identified] : mycoplasma/ureaplasma

## 2024-08-22 ENCOUNTER — APPOINTMENT (OUTPATIENT)
Dept: HEMATOLOGY ONCOLOGY | Facility: CLINIC | Age: 34
End: 2024-08-22

## 2024-08-22 ENCOUNTER — NON-APPOINTMENT (OUTPATIENT)
Age: 34
End: 2024-08-22

## 2024-08-22 VITALS
DIASTOLIC BLOOD PRESSURE: 76 MMHG | WEIGHT: 125 LBS | HEART RATE: 114 BPM | TEMPERATURE: 97.9 F | RESPIRATION RATE: 18 BRPM | BODY MASS INDEX: 22.15 KG/M2 | OXYGEN SATURATION: 97 % | HEIGHT: 63 IN | SYSTOLIC BLOOD PRESSURE: 113 MMHG

## 2024-08-22 LAB
BV BACTERIA RRNA VAG QL NAA+PROBE: NOT DETECTED
C GLABRATA RNA VAG QL NAA+PROBE: NOT DETECTED
C TRACH RRNA SPEC QL NAA+PROBE: NOT DETECTED
CANDIDA RRNA VAG QL PROBE: NOT DETECTED
HBV SURFACE AG SER QL: NONREACTIVE
HCV AB SER QL: NONREACTIVE
HCV S/CO RATIO: 0.13 S/CO
HIV1+2 AB SPEC QL IA.RAPID: NONREACTIVE
HPV HIGH+LOW RISK DNA PNL CVX: NOT DETECTED
N GONORRHOEA RRNA SPEC QL NAA+PROBE: NOT DETECTED
T VAGINALIS RRNA SPEC QL NAA+PROBE: NOT DETECTED

## 2024-08-22 PROCEDURE — 99214 OFFICE O/P EST MOD 30 MIN: CPT

## 2024-08-23 LAB
BACTERIA UR CULT: NORMAL
T PALLIDUM AB SER QL IA: NEGATIVE

## 2024-08-27 LAB — CYTOLOGY CVX/VAG DOC THIN PREP: ABNORMAL

## 2024-08-27 NOTE — ASSESSMENT
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  Previously given NovoSeven prior to surgical procedure, no significant clinical bleeding history.  The patient is potentially pending cosmetic surgery including liposuction and breast augmentation.  If she elects to proceed with these operations at [facility] we can assist with arrangement of NovoSeven administration on the day of the procedure.  However, she is not sure where she wants to go for these operations yet.  She will follow-up with us when she has further details on her plans.  Also given information for bleeding clinic at [place] should she decide to proceed with non-[facility] surgeon

## 2024-08-27 NOTE — ASSESSMENT
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  Previously given NovoSeven prior to surgical procedure, no significant clinical bleeding history.  The patient is potentially pending cosmetic surgery including liposuction and breast augmentation.  If she elects to proceed with these operations at [facility] we can assist with arrangement of NovoSeven administration on the day of the procedure.  However, she is not sure where she wants to go for these operations yet.  She will follow-up with us when she has further details on her plans.  Also given information for bleeding clinic at [place] should she decide to proceed with non-[facility] surgeon

## 2024-08-27 NOTE — HISTORY OF PRESENT ILLNESS
[de-identified] : 34 year old woman with a history of heterozygous Factor VII mutation. Levels in excess of 20% had not required factor or FFP for procedures in the past. Ms. London was referred to [facility] hematoogy after a [facility] ED visit on 2/6/17 for epistaxis and elevated PT. The patient has been having nose bleeds for this past month lasting between 10 min to 1 hour, from L nostril. She has had 3 cauterizations by ENT for it, most recently on 2/2/17. She reports no recent trauma to nose. She has had intermittent epistaxis in the past, but not to this extent. No bruising history, no FHx of bleeding (has 1 older brother). She has regular menses, lasting for about 7 days, 3 pads/day, not heavy, no blood clots.  Factor VII activty 20-27%.  Patient had a leep procedure, no factor administered no bleeding, then had a rhinoplasty following this, patient was treated with Novo7 leading up to the surgery, with this patient did not experience much post surgical bleeding after rhinoplasty.  patient has had some slight epistaxis since then. They do stop bleeding after a short while during the winter. Otherwise normal bruises and cuts have a normal clinical course.   [de-identified] : Patient is here for initial hematology visit in [place]-previously saw Dr. Hopper in [place].  She is currently being evaluated by plastic surgery for potential liposuction procedure and/or breast augmentation but has not settled on a surgeon for sure nor decided the specifics of which procedures she plans to undergo.

## 2024-08-27 NOTE — HISTORY OF PRESENT ILLNESS
[de-identified] : 34 year old woman with a history of heterozygous Factor VII mutation. Levels in excess of 20% had not required factor or FFP for procedures in the past. Ms. London was referred to [facility] hematoogy after a [facility] ED visit on 2/6/17 for epistaxis and elevated PT. The patient has been having nose bleeds for this past month lasting between 10 min to 1 hour, from L nostril. She has had 3 cauterizations by ENT for it, most recently on 2/2/17. She reports no recent trauma to nose. She has had intermittent epistaxis in the past, but not to this extent. No bruising history, no FHx of bleeding (has 1 older brother). She has regular menses, lasting for about 7 days, 3 pads/day, not heavy, no blood clots.  Factor VII activty 20-27%.  Patient had a leep procedure, no factor administered no bleeding, then had a rhinoplasty following this, patient was treated with Novo7 leading up to the surgery, with this patient did not experience much post surgical bleeding after rhinoplasty.  patient has had some slight epistaxis since then. They do stop bleeding after a short while during the winter. Otherwise normal bruises and cuts have a normal clinical course.   [de-identified] : Patient is here for initial hematology visit in [place]-previously saw Dr. Hopper in [place].  She is currently being evaluated by plastic surgery for potential liposuction procedure and/or breast augmentation but has not settled on a surgeon for sure nor decided the specifics of which procedures she plans to undergo.

## 2024-08-28 ENCOUNTER — NON-APPOINTMENT (OUTPATIENT)
Age: 34
End: 2024-08-28

## 2024-08-28 LAB
MYCOPLASMA HOMINIS CULTURE: NEGATIVE
UREAPLASMA CULTURE: NEGATIVE

## 2024-09-06 ENCOUNTER — APPOINTMENT (OUTPATIENT)
Dept: OBGYN | Facility: CLINIC | Age: 34
End: 2024-09-06
Payer: COMMERCIAL

## 2024-09-06 ENCOUNTER — APPOINTMENT (OUTPATIENT)
Dept: OBGYN | Facility: CLINIC | Age: 34
End: 2024-09-06

## 2024-09-06 VITALS
DIASTOLIC BLOOD PRESSURE: 79 MMHG | BODY MASS INDEX: 21.97 KG/M2 | WEIGHT: 124 LBS | HEIGHT: 63 IN | HEART RATE: 108 BPM | SYSTOLIC BLOOD PRESSURE: 117 MMHG

## 2024-09-06 PROCEDURE — 99213 OFFICE O/P EST LOW 20 MIN: CPT

## 2024-09-11 LAB
APPEARANCE: CLEAR
BACTERIA UR CULT: NORMAL
BILIRUBIN URINE: NEGATIVE
BLOOD URINE: NEGATIVE
BV BACTERIA RRNA VAG QL NAA+PROBE: NOT DETECTED
C GLABRATA RNA VAG QL NAA+PROBE: NOT DETECTED
CANDIDA RRNA VAG QL PROBE: NOT DETECTED
COLOR: YELLOW
GLUCOSE QUALITATIVE U: NEGATIVE MG/DL
KETONES URINE: NEGATIVE MG/DL
LEUKOCYTE ESTERASE URINE: NEGATIVE
NITRITE URINE: NEGATIVE
PH URINE: 6.5
PROTEIN URINE: NEGATIVE MG/DL
SPECIFIC GRAVITY URINE: 1.01
T VAGINALIS RRNA SPEC QL NAA+PROBE: NOT DETECTED
UROBILINOGEN URINE: 0.2 MG/DL

## 2024-09-11 NOTE — PLAN
[FreeTextEntry1] : ucx and urinalysis vaginal culture rx pelvic sono discussed if bleeding irregularly continues discussed rpt pap in one yr (ascus hpv neg)

## 2024-09-11 NOTE — HISTORY OF PRESENT ILLNESS
[FreeTextEntry1] : presents for follow up to discuss continued symptoms.   still having burning. when she urinates has burning internally. might be urethral. never used the lotrisone in early august because started feeling better. started feeling bad again. symptoms come and go. this morning before the gym she had spotting. lmp 8/25 or 8/26. lasted 3 days. it was a normal period. currently cycle day 13 now.   since she had a leep her periods have been shorter, used to be 5 days. also lighter most of the time but can also be heavier.   has not been sexually active for the last seven weeks. sometimes has pelvic heaviness and cramping. usually after the gym. last time she was seen by mira was in january

## 2024-09-30 ENCOUNTER — TRANSCRIPTION ENCOUNTER (OUTPATIENT)
Age: 34
End: 2024-09-30

## 2024-09-30 DIAGNOSIS — N92.6 IRREGULAR MENSTRUATION, UNSPECIFIED: ICD-10-CM

## 2024-10-01 ENCOUNTER — APPOINTMENT (OUTPATIENT)
Dept: OBGYN | Facility: CLINIC | Age: 34
End: 2024-10-01
Payer: COMMERCIAL

## 2024-10-01 VITALS
DIASTOLIC BLOOD PRESSURE: 83 MMHG | SYSTOLIC BLOOD PRESSURE: 120 MMHG | HEIGHT: 63 IN | BODY MASS INDEX: 22.27 KG/M2 | WEIGHT: 125.66 LBS

## 2024-10-01 DIAGNOSIS — Z11.3 ENCOUNTER FOR SCREENING FOR INFECTIONS WITH A PREDOMINANTLY SEXUAL MODE OF TRANSMISSION: ICD-10-CM

## 2024-10-01 PROCEDURE — 99213 OFFICE O/P EST LOW 20 MIN: CPT

## 2024-10-01 PROCEDURE — 36415 COLL VENOUS BLD VENIPUNCTURE: CPT

## 2024-10-01 NOTE — END OF VISIT
[FreeTextEntry3] :  I, Agata Dominguez, acted as a scribe on behalf of Dr. Marianna Pa M.D. on 10/01/2024.   All medical entries made by the scribe were at my, Dr. Marianna Pa M.D., direction and personally dictated by me on 10/01/2024. I have reviewed the chart and agree that the record accurately reflects my personal performance of the history, physical exam, assessment and plan. I have also personally directed, reviewed, and agreed with the chart.

## 2024-10-01 NOTE — PHYSICAL EXAM
[Appropriately responsive] : appropriately responsive [Alert] : alert [No Acute Distress] : no acute distress [Oriented x3] : oriented x3 [Labia Minora] : normal [Labia Majora] : normal [Normal] : normal [Uterine Adnexae] : normal

## 2024-10-01 NOTE — HISTORY OF PRESENT ILLNESS
[FreeTextEntry1] :  34 year old G0 female presents for STI screen. Going for Pelvic US on Saturday 10/5 to evaluate persistent vaginal spotting. Reports new partner since last visit and would like STI screen.

## 2024-10-02 ENCOUNTER — TRANSCRIPTION ENCOUNTER (OUTPATIENT)
Age: 34
End: 2024-10-02

## 2024-10-02 LAB
BV BACTERIA RRNA VAG QL NAA+PROBE: NOT DETECTED
C GLABRATA RNA VAG QL NAA+PROBE: NOT DETECTED
C TRACH RRNA SPEC QL NAA+PROBE: NOT DETECTED
CANDIDA RRNA VAG QL PROBE: NOT DETECTED
HBV SURFACE AG SER QL: NONREACTIVE
HCV AB SER QL: NONREACTIVE
HCV S/CO RATIO: 0.12 S/CO
HIV1+2 AB SPEC QL IA.RAPID: NONREACTIVE
N GONORRHOEA RRNA SPEC QL NAA+PROBE: NOT DETECTED
T PALLIDUM AB SER QL IA: NEGATIVE
T VAGINALIS RRNA SPEC QL NAA+PROBE: NOT DETECTED

## 2024-10-05 ENCOUNTER — OUTPATIENT (OUTPATIENT)
Dept: OUTPATIENT SERVICES | Facility: HOSPITAL | Age: 34
LOS: 1 days | End: 2024-10-05
Payer: COMMERCIAL

## 2024-10-05 ENCOUNTER — APPOINTMENT (OUTPATIENT)
Dept: ULTRASOUND IMAGING | Facility: CLINIC | Age: 34
End: 2024-10-05

## 2024-10-05 DIAGNOSIS — Z98.890 OTHER SPECIFIED POSTPROCEDURAL STATES: Chronic | ICD-10-CM

## 2024-10-05 DIAGNOSIS — N92.6 IRREGULAR MENSTRUATION, UNSPECIFIED: ICD-10-CM

## 2024-10-05 PROCEDURE — 76856 US EXAM PELVIC COMPLETE: CPT | Mod: 26,59

## 2024-10-05 PROCEDURE — 76830 TRANSVAGINAL US NON-OB: CPT | Mod: 26

## 2024-10-05 PROCEDURE — 76856 US EXAM PELVIC COMPLETE: CPT

## 2024-10-05 PROCEDURE — 76830 TRANSVAGINAL US NON-OB: CPT

## 2024-10-11 ENCOUNTER — TRANSCRIPTION ENCOUNTER (OUTPATIENT)
Age: 34
End: 2024-10-11

## 2024-10-11 DIAGNOSIS — D25.0 ABNORMAL UTERINE AND VAGINAL BLEEDING, UNSPECIFIED: ICD-10-CM

## 2024-10-11 DIAGNOSIS — N83.209 UNSPECIFIED OVARIAN CYST, UNSPECIFIED SIDE: ICD-10-CM

## 2024-10-11 DIAGNOSIS — N93.9 ABNORMAL UTERINE AND VAGINAL BLEEDING, UNSPECIFIED: ICD-10-CM

## 2024-10-12 ENCOUNTER — NON-APPOINTMENT (OUTPATIENT)
Age: 34
End: 2024-10-12

## 2024-10-21 ENCOUNTER — OUTPATIENT (OUTPATIENT)
Dept: OUTPATIENT SERVICES | Facility: HOSPITAL | Age: 34
LOS: 1 days | Discharge: HOME | End: 2024-10-21

## 2024-10-21 DIAGNOSIS — R79.1 ABNORMAL COAGULATION PROFILE: ICD-10-CM

## 2024-10-21 DIAGNOSIS — Z98.890 OTHER SPECIFIED POSTPROCEDURAL STATES: Chronic | ICD-10-CM

## 2024-10-24 NOTE — ED ADULT NURSE NOTE - OBJECTIVE STATEMENT
FAMILY HISTORY:  FH: colon cancer    
Received pt in  pt calm & cooperative c/o insomnia pt denies Si/Hi/AVh eval on going.

## 2024-10-25 ENCOUNTER — APPOINTMENT (OUTPATIENT)
Dept: HEMATOLOGY ONCOLOGY | Facility: CLINIC | Age: 34
End: 2024-10-25

## 2024-10-25 VITALS
RESPIRATION RATE: 17 BRPM | HEART RATE: 80 BPM | BODY MASS INDEX: 24.54 KG/M2 | SYSTOLIC BLOOD PRESSURE: 98 MMHG | HEIGHT: 60 IN | TEMPERATURE: 98 F | WEIGHT: 125 LBS | DIASTOLIC BLOOD PRESSURE: 59 MMHG | OXYGEN SATURATION: 98 %

## 2024-10-25 LAB
APTT BLD: 32.3 SEC
INR PPP: 1.37 RATIO
PT BLD: 16.2 SEC

## 2024-10-25 PROCEDURE — 99214 OFFICE O/P EST MOD 30 MIN: CPT

## 2024-10-29 LAB — FACT VII ACT/NOR PPP: 23 %

## 2024-11-12 ENCOUNTER — OUTPATIENT (OUTPATIENT)
Dept: OUTPATIENT SERVICES | Facility: HOSPITAL | Age: 34
LOS: 1 days | End: 2024-11-12
Payer: COMMERCIAL

## 2024-11-12 VITALS
RESPIRATION RATE: 16 BRPM | HEART RATE: 76 BPM | HEIGHT: 64 IN | OXYGEN SATURATION: 97 % | DIASTOLIC BLOOD PRESSURE: 79 MMHG | TEMPERATURE: 98 F | SYSTOLIC BLOOD PRESSURE: 114 MMHG | WEIGHT: 123.9 LBS

## 2024-11-12 DIAGNOSIS — D68.2 HEREDITARY DEFICIENCY OF OTHER CLOTTING FACTORS: ICD-10-CM

## 2024-11-12 DIAGNOSIS — N93.9 ABNORMAL UTERINE AND VAGINAL BLEEDING, UNSPECIFIED: ICD-10-CM

## 2024-11-12 DIAGNOSIS — Z98.890 OTHER SPECIFIED POSTPROCEDURAL STATES: Chronic | ICD-10-CM

## 2024-11-12 DIAGNOSIS — Z01.818 ENCOUNTER FOR OTHER PREPROCEDURAL EXAMINATION: ICD-10-CM

## 2024-11-12 PROBLEM — Z87.19 PERSONAL HISTORY OF OTHER DISEASES OF THE DIGESTIVE SYSTEM: Chronic | Status: INACTIVE | Noted: 2023-11-08 | Resolved: 2024-11-12

## 2024-11-12 PROBLEM — Z86.59 PERSONAL HISTORY OF OTHER MENTAL AND BEHAVIORAL DISORDERS: Chronic | Status: INACTIVE | Noted: 2023-11-08 | Resolved: 2024-11-12

## 2024-11-12 PROBLEM — Z87.42 PERSONAL HISTORY OF OTHER DISEASES OF THE FEMALE GENITAL TRACT: Chronic | Status: INACTIVE | Noted: 2023-11-08 | Resolved: 2024-11-12

## 2024-11-12 PROBLEM — K58.9 IRRITABLE BOWEL SYNDROME, UNSPECIFIED: Chronic | Status: INACTIVE | Noted: 2018-05-31 | Resolved: 2024-11-12

## 2024-11-12 PROBLEM — N84.0 POLYP OF CORPUS UTERI: Chronic | Status: INACTIVE | Noted: 2023-09-06 | Resolved: 2024-11-12

## 2024-11-12 LAB
ANION GAP SERPL CALC-SCNC: 12 MMOL/L — SIGNIFICANT CHANGE UP (ref 5–17)
APTT BLD: 33.6 SEC — SIGNIFICANT CHANGE UP (ref 24.5–35.6)
BLD GP AB SCN SERPL QL: NEGATIVE — SIGNIFICANT CHANGE UP
BUN SERPL-MCNC: 21 MG/DL — SIGNIFICANT CHANGE UP (ref 7–23)
CALCIUM SERPL-MCNC: 9.4 MG/DL — SIGNIFICANT CHANGE UP (ref 8.4–10.5)
CHLORIDE SERPL-SCNC: 104 MMOL/L — SIGNIFICANT CHANGE UP (ref 96–108)
CO2 SERPL-SCNC: 23 MMOL/L — SIGNIFICANT CHANGE UP (ref 22–31)
CREAT SERPL-MCNC: 0.73 MG/DL — SIGNIFICANT CHANGE UP (ref 0.5–1.3)
EGFR: 111 ML/MIN/1.73M2 — SIGNIFICANT CHANGE UP
GLUCOSE SERPL-MCNC: 94 MG/DL — SIGNIFICANT CHANGE UP (ref 70–99)
HCT VFR BLD CALC: 44.4 % — SIGNIFICANT CHANGE UP (ref 34.5–45)
HGB BLD-MCNC: 14.2 G/DL — SIGNIFICANT CHANGE UP (ref 11.5–15.5)
INR BLD: 1.33 RATIO — HIGH (ref 0.85–1.16)
MCHC RBC-ENTMCNC: 28.7 PG — SIGNIFICANT CHANGE UP (ref 27–34)
MCHC RBC-ENTMCNC: 32 G/DL — SIGNIFICANT CHANGE UP (ref 32–36)
MCV RBC AUTO: 89.9 FL — SIGNIFICANT CHANGE UP (ref 80–100)
NRBC # BLD: 0 /100 WBCS — SIGNIFICANT CHANGE UP (ref 0–0)
PLATELET # BLD AUTO: 323 K/UL — SIGNIFICANT CHANGE UP (ref 150–400)
POTASSIUM SERPL-MCNC: 3.9 MMOL/L — SIGNIFICANT CHANGE UP (ref 3.5–5.3)
POTASSIUM SERPL-SCNC: 3.9 MMOL/L — SIGNIFICANT CHANGE UP (ref 3.5–5.3)
PROTHROM AB SERPL-ACNC: 15.1 SEC — HIGH (ref 9.9–13.4)
RBC # BLD: 4.94 M/UL — SIGNIFICANT CHANGE UP (ref 3.8–5.2)
RBC # FLD: 12.7 % — SIGNIFICANT CHANGE UP (ref 10.3–14.5)
RH IG SCN BLD-IMP: POSITIVE — SIGNIFICANT CHANGE UP
SODIUM SERPL-SCNC: 139 MMOL/L — SIGNIFICANT CHANGE UP (ref 135–145)
WBC # BLD: 5.82 K/UL — SIGNIFICANT CHANGE UP (ref 3.8–10.5)
WBC # FLD AUTO: 5.82 K/UL — SIGNIFICANT CHANGE UP (ref 3.8–10.5)

## 2024-11-12 PROCEDURE — 86850 RBC ANTIBODY SCREEN: CPT

## 2024-11-12 PROCEDURE — 86901 BLOOD TYPING SEROLOGIC RH(D): CPT

## 2024-11-12 PROCEDURE — 86900 BLOOD TYPING SEROLOGIC ABO: CPT

## 2024-11-12 PROCEDURE — 80048 BASIC METABOLIC PNL TOTAL CA: CPT

## 2024-11-12 PROCEDURE — 85027 COMPLETE CBC AUTOMATED: CPT

## 2024-11-12 PROCEDURE — 85730 THROMBOPLASTIN TIME PARTIAL: CPT

## 2024-11-12 PROCEDURE — G0463: CPT

## 2024-11-12 PROCEDURE — 85610 PROTHROMBIN TIME: CPT

## 2024-11-12 RX ORDER — 0.9 % SODIUM CHLORIDE 0.9 %
1000 INTRAVENOUS SOLUTION INTRAVENOUS
Refills: 0 | Status: DISCONTINUED | OUTPATIENT
Start: 2024-12-03 | End: 2024-12-17

## 2024-11-12 NOTE — H&P PST ADULT - PROBLEM SELECTOR PLAN 1
Pt is scheduled for a D&C, operative hysteroscopy and submucous fibroid resection with Dr. Vicente on 12/3/24  CBC, BMP and T/S ordered and obtained at PST  ABO, Urine HCG on admit

## 2024-11-12 NOTE — H&P PST ADULT - HISTORY OF PRESENT ILLNESS
34 year [facility] factor [facility] reports c/o irregular menstrual cycle with spotting for 2 months s/p pelvic ultrasound revealed an abnormal uterine mass. Pt now presents to PST for scheduled D&C, operative hysteroscopy and submucous fibroid resection with Dr. Vicente on 12/3/24 at the ambulatory care center.

## 2024-11-12 NOTE — H&P PST ADULT - PROBLEM SELECTOR PLAN 2
Hematology eval in chart  Coags drawn at PST  As per hematologist, "pt does not require additional medications for optimization"

## 2024-11-12 NOTE — H&P PST ADULT - NSICDXPASTMEDICALHX_GEN_ALL_CORE_FT
PAST MEDICAL HISTORY:  Abnormal uterine and vaginal bleeding, unspecified     Environmental asthma     Factor VII deficiency     GERD (gastroesophageal reflux disease)     History of irritable bowel syndrome     Irritable bowel syndrome, unspecified type     Right ovarian cyst      PAST MEDICAL HISTORY:  Abnormal uterine and vaginal bleeding, unspecified     Environmental asthma     Factor VII deficiency     GERD (gastroesophageal reflux disease)     History of irritable bowel syndrome     Right ovarian cyst

## 2024-11-12 NOTE — H&P PST ADULT - ASSESSMENT
DASI score: 9.89  DASI activity: Able to walk 2-3 blocks, ADLs, Grocery Shopping, 1 Flight of Stairs, works as an , goes to the gym 5-6x per week (walking/stairmaster on treadmill 30 minutes, weight-lifting x 30 minutes)  Loose teeth or denture: denies

## 2024-11-27 NOTE — ED PROVIDER NOTE - CPE EDP GASTRO NORM
HEARING AID CHECK    Audiology-Hearing Aid    :  Signia // Model/style/color: Motion C&G SP 5X / BTE / Silver  Date of purchase/dispense date: 06/10/2024   REPAIR WARRANTY EXPIRATION DATE: 2027   Loss and Damage expiration date: 2027  [employer] expiration date: 06/10/2025  Serial # right: [id number] //  Serial # left: [id number]  Payor: 9/Medicaid    Battery size: Internal Lithium Ion    Earmold : Signia  ---Earmold style and material:Full shell silicone carved  ---Earmold serial # right:[id number] // left:[id number]  ---Earmold warranty expiration date: 90 days  ---Tubin  Accessory: Signia   ---Accessory serial # [id number]    ---Accessory warranty expiration date: 2027   used: [employer]    Updated by Can Santana on 3/25/2024  ==================================  OLD HEARING AIDS  Date of purchase/dispense date: 18  : Phonak  Model/style/color: Michelle V50-SP/ BTE/silver-gray  Serial # right: [id number] // Serial # left: [id number]  Battery size: 13   used: iCube II    Earmold : Unitron (Fit in 2022)  ---Earmold style and material:Full shell silicone  ---Earmold serial # right:[id number] left:[id number]    Updated by JARED Jc on 2018; Updated by Can Santana on 2021  =====================================  : Phonak   Model/style/color: Michelle III SP standard BTE/light gray  Serial # right: [id number] // Serial # left: [id number]  Battery size: 13    SUBJECTIVE:  Reason for visit: This is her first follow-up after her hearing aid fitting in .  She is not having any issues.    OBJECTIVE:  Services provided:   Otoscopy:  minimal cerumen right. and moderate, non-occluding cerumen left.  The left was successfully cleaned by curette.  Cleaned Hearing Aid Parts: microphone  Supply:  purchased 1 bottle of  otoease.    ASSESSMENT:  Verena is happy with the volume and rechargability of the hearing aids.  She does not want any changes.    FOLLOW-UP:  PRN.  Continue use of hearing aid.  Return as needed for cleaning, supplies, and programming.     - - -

## 2024-12-03 ENCOUNTER — APPOINTMENT (OUTPATIENT)
Dept: OBGYN | Facility: HOSPITAL | Age: 34
End: 2024-12-03

## 2024-12-03 ENCOUNTER — TRANSCRIPTION ENCOUNTER (OUTPATIENT)
Age: 34
End: 2024-12-03

## 2024-12-03 ENCOUNTER — OUTPATIENT (OUTPATIENT)
Dept: OUTPATIENT SERVICES | Facility: HOSPITAL | Age: 34
LOS: 1 days | End: 2024-12-03
Payer: COMMERCIAL

## 2024-12-03 ENCOUNTER — RESULT REVIEW (OUTPATIENT)
Age: 34
End: 2024-12-03

## 2024-12-03 VITALS
DIASTOLIC BLOOD PRESSURE: 75 MMHG | RESPIRATION RATE: 16 BRPM | HEART RATE: 81 BPM | TEMPERATURE: 97 F | SYSTOLIC BLOOD PRESSURE: 115 MMHG | OXYGEN SATURATION: 99 %

## 2024-12-03 VITALS
DIASTOLIC BLOOD PRESSURE: 74 MMHG | TEMPERATURE: 97 F | SYSTOLIC BLOOD PRESSURE: 108 MMHG | HEIGHT: 64 IN | RESPIRATION RATE: 18 BRPM | OXYGEN SATURATION: 100 % | HEART RATE: 94 BPM | WEIGHT: 123.9 LBS

## 2024-12-03 DIAGNOSIS — Z98.890 OTHER SPECIFIED POSTPROCEDURAL STATES: Chronic | ICD-10-CM

## 2024-12-03 DIAGNOSIS — N93.9 ABNORMAL UTERINE AND VAGINAL BLEEDING, UNSPECIFIED: ICD-10-CM

## 2024-12-03 PROCEDURE — 88305 TISSUE EXAM BY PATHOLOGIST: CPT | Mod: 26

## 2024-12-03 PROCEDURE — 88305 TISSUE EXAM BY PATHOLOGIST: CPT

## 2024-12-03 PROCEDURE — 58561 HYSTEROSCOPY REMOVE MYOMA: CPT

## 2024-12-03 RX ORDER — CEFAZOLIN SODIUM 10 G
2000 VIAL (EA) INJECTION ONCE
Refills: 0 | Status: COMPLETED | OUTPATIENT
Start: 2024-12-03 | End: 2024-12-03

## 2024-12-03 RX ORDER — ONDANSETRON HYDROCHLORIDE 4 MG/1
4 TABLET, FILM COATED ORAL ONCE
Refills: 0 | Status: DISCONTINUED | OUTPATIENT
Start: 2024-12-03 | End: 2024-12-17

## 2024-12-03 RX ORDER — LIDOCAINE HCL 20 MG/ML
0.2 VIAL (ML) INJECTION ONCE
Refills: 0 | Status: COMPLETED | OUTPATIENT
Start: 2024-12-03 | End: 2024-12-03

## 2024-12-03 RX ORDER — HYDROMORPHONE HYDROCHLORIDE 2 MG/1
0.5 TABLET ORAL
Refills: 0 | Status: DISCONTINUED | OUTPATIENT
Start: 2024-12-03 | End: 2024-12-03

## 2024-12-03 RX ORDER — FENTANYL 12 UG/H
25 PATCH, EXTENDED RELEASE TRANSDERMAL
Refills: 0 | Status: DISCONTINUED | OUTPATIENT
Start: 2024-12-03 | End: 2024-12-03

## 2024-12-03 RX ADMIN — Medication 100 MILLILITER(S): at 10:18

## 2024-12-03 RX ADMIN — FENTANYL 25 MICROGRAM(S): 12 PATCH, EXTENDED RELEASE TRANSDERMAL at 13:40

## 2024-12-03 RX ADMIN — FENTANYL 25 MICROGRAM(S): 12 PATCH, EXTENDED RELEASE TRANSDERMAL at 13:24

## 2024-12-03 NOTE — ASU PATIENT PROFILE, ADULT - FALL HARM RISK - UNIVERSAL INTERVENTIONS
Bed in lowest position, wheels locked, appropriate side rails in place/Call bell, personal items and telephone in reach/Instruct patient to call for assistance before getting out of bed or chair/Non-slip footwear when patient is out of bed/Kimper to call system/Physically safe environment - no spills, clutter or unnecessary equipment/Purposeful Proactive Rounding/Room/bathroom lighting operational, light cord in reach

## 2024-12-03 NOTE — ASU DISCHARGE PLAN (ADULT/PEDIATRIC) - CARE PROVIDER_API CALL
Susy Vicente  Obstetrics and Gynecology  [address] [place] [address], [address]  [place] [address]  Phone: [phone]  Fax: [phone]  Established Patient  Follow Up Time: 2 weeks

## 2024-12-03 NOTE — CHART NOTE - NSCHARTNOTEFT_GEN_A_CORE
R1 OBGYN POST-OP CHECK    S: Patient seen and evaluated at bedside. Pt awake and alert resting comfortably in chair. Patient has been OOB without issue.  Patient reports pain controlled with analgesia. She reports mild throbbing. Pt denies N/V, SOB, CP, palpitations, fever/chills. Tolerating clears. Patient has not yet voided.    O:   T(C): 36.3 (12-03-24 @ 13:50), Max: 36.5 (12-03-24 @ 12:50)  HR: 81 (12-03-24 @ 13:50) (60 - 94)  BP: 115/75 (12-03-24 @ 13:50) (95/64 - 115/75)  RR: 16 (12-03-24 @ 13:50) (13 - 19)  SpO2: 99% (12-03-24 @ 13:50) (97% - 100%)  Wt(kg): --  I&O's Summary    03 Dec 2024 07:01  -  03 Dec 2024 14:20  --------------------------------------------------------  IN: 1300 mL / OUT: 5 mL / NET: 1295 mL        Gen: Resting comfortably in bed, NAD  CV: S1S2, RRR  Lungs: CTA B/L  Abd: soft, mildly tender, nondistended  : minimal bleeding on pad  Ext: SCD's in place and functional, non-tender b/l, no edema        A/P: 34y Female s/p operative hysteroscopy, myomectomy, and dilation and curettage. Patient is doing well post-op. VSS, AF.    Neuro: PO Analgesia PRN   CV: Hemodynamically stable.  Monitor VS.   Pulm: Saturating well on room air.  Encourage OOB and incentive spirometer use.   GI: Continue regular diet. Anti-emetics PRN.  : patient has yet to void, but plans to soon  Heme: DVT ppx w/ SCD's while in bed. Early ambulation, initially with assistance then as tolerated.    ID: Afebrile  Endo: No active issues   Dispo: Discharge from PACU when criteria met.     D/w Dr. Vicente, attending, and Dr. Damico, fellow.  Jaelyn Veras, PGY-1

## 2024-12-03 NOTE — ASU PATIENT PROFILE, ADULT - NSICDXPASTMEDICALHX_GEN_ALL_CORE_FT
PAST MEDICAL HISTORY:  Abnormal uterine and vaginal bleeding, unspecified     Environmental asthma     Factor VII deficiency     GERD (gastroesophageal reflux disease)     History of irritable bowel syndrome     Right ovarian cyst

## 2024-12-03 NOTE — ASU PATIENT PROFILE, ADULT - NS TRANSFER PATIENT BELONGINGS
JIMMYPRAVIN SCHREIBER    7Y 1M old Female, : 2011    Account Number: [id number]    [address] [facility], [place], [place]    Home: [phone]     Guarantor: JIMMYJANELLE Insurance: [facility] Payer ID:    PCP: Art Baca MD    Appointment Facility: [facility]      2018 Progress Notes: José Miguel Obando MD       Current Medications Reason for Appointment     1. TONSIL CONSULT     2. Adenotonsillar hypertrophy     History of Present Illness     HPI:   The patient is a 7-year-old little girl who comes to the office today with her parents to discuss her adenotonsillar hypertrophy and recurring tonsil infections. She was treated 3 times since the  of the year for recurring strep throat. She had not previously had significant recurring infections. She does have significant lingering adenotonsillar hypertrophy. She is a heroic snorer. She is very fragmented sleep. She has some daytime fatigue and minor behavior issues. She has no swallowing difficulties. She does not have any affected speech. There is no personal history or family history of bleeding disorder or anesthesia difficulties.     Examination     General Examination:  Oral cavity oropharynx-3 to 4+ tonsillar hypertrophy without exudate or inflammation   Neck-no masses or adenopathy   Head neck integument-Clear   Nasal-no obstruction or purulence   General-the patient appears well and in no distress   Neuro-there are no focal cranial nerve deficits.       Assessments     1. Adenotonsillar hypertrophy - J35.3 (Primary)     Treatment     1. Others   Notes: I would advise tonsillectomy and adenoidectomy given the obstructive sleep history. The procedure, expected postoperative course, possible reviewed for the family. They do understand and wish to proceed.  Procedures  [ ].                                   None Electronically signed by JOSÉ MIGUEL OBANDO MD on 2018 at 07:54 AM CDT     Sign off status: Completed    Allergies      RUBIN.     Review of Systems     [facility] [place]  [address] [place] [address]  Tel: [phone]  Fax:       [ ].           Patient: PRAVIN MARQUEZ : 2011 Progress Note: José Miguel Obando MD 2018        Note generated by [employer]MR/PM Software ([email or web address])       placed in locker by pt/Cell Phone/PDA (specify)/Clothing

## 2024-12-03 NOTE — BRIEF OPERATIVE NOTE - OPERATION/FINDINGS
Uterus sounded to 6cm. Hysteroscopy revealed posterior ~1cm FIGO Type 1 uterine myoma. Difficulty visualizing b/l tubal ostia. Procedure performed under US guidance.

## 2024-12-03 NOTE — ASU DISCHARGE PLAN (ADULT/PEDIATRIC) - FINANCIAL ASSISTANCE
[facility] provides services at a reduced cost to those who are determined to be eligible through [facility]’s financial assistance program. Information regarding [facility]’s financial assistance program can be found by going to [email or web address] or by calling [phone].

## 2024-12-03 NOTE — ASU PREOP CHECKLIST - PATIENT'S PERSONAL PROPERTY REMOVED
removed by pt and placed in labeled cup  in locker by pt ,ankle bracelet, necklace ,earrings and 2 bracelet/jewelry

## 2024-12-03 NOTE — ASU PREOP CHECKLIST - ISOLATION PRECAUTIONS
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 Start IV PPI. GI consulted via email (likely will see in AM). Keep patient NPO for now. Check CBC, CMP, type + screen. Transfuse PRN Hgb > 8. Plan to admit to medicine. 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 Start IV PPI. GI consulted via email (likely will see in AM). Keep patient NPO for now. Check CBC, CMP, type + screen. Transfuse PRN Hgb > 8. Plan to admit to medicine.    WEST Ceballos MD: Pt is an 86 y/o female with PMH mediastinal mass, CAD s/p stents/CABG on asa, Hep C s/p tx, AFib on eliquis cirrhosis, colovesicle fistula who p/w c/o  2 weeks of black tarry stools and fever since yesterday. Pt's son reports that she was recently treated for an "E. coli" UTI, however, they do not recall with which abx. Pt reports a fever as high as "106" yesterday. Took tylenol PTA. Reported mild AP initially, none currently. Does report some SP discomfort in general. Exam as above. Plan: basic labs, sepsis w/u, u/a, ucx, coags, CTAP, protonix, abx, likely TBA

## 2024-12-03 NOTE — ASU DISCHARGE PLAN (ADULT/PEDIATRIC) - NURSING INSTRUCTIONS
OK to take Tylenol/Acetaminophen at 5:30PM TODAY (last dose @ 11:30AM    in operating room)  for pain and every 6 hours after as needed. OK to take Motrin/Ibuprofen at 6:30PM TODAY (last dose @   12:30PM  in operating room)  for pain and every 6 hours after as needed.

## 2024-12-06 LAB — SURGICAL PATHOLOGY STUDY: SIGNIFICANT CHANGE UP

## 2024-12-18 ENCOUNTER — APPOINTMENT (OUTPATIENT)
Dept: OBGYN | Facility: CLINIC | Age: 34
End: 2024-12-18
Payer: COMMERCIAL

## 2024-12-18 VITALS
BODY MASS INDEX: 24.54 KG/M2 | DIASTOLIC BLOOD PRESSURE: 87 MMHG | HEIGHT: 60 IN | HEART RATE: 88 BPM | SYSTOLIC BLOOD PRESSURE: 120 MMHG | WEIGHT: 125 LBS

## 2024-12-18 PROCEDURE — 99212 OFFICE O/P EST SF 10 MIN: CPT

## 2025-04-03 ENCOUNTER — NON-APPOINTMENT (OUTPATIENT)
Age: 35
End: 2025-04-03

## 2025-06-30 NOTE — BRIEF OPERATIVE NOTE - DISPOSITION
"Tania     Heart disease Mother Tania     Hypertension Mother Tania     Liver cancer Father Pb Iraheta     Cancer Father Pb Iraheta     Alcohol abuse Father Pb Iraheta     No Known Problems Sister      Heart disease Brother Abraham     Hypertension Brother Abraham     wise.   -----------------------------------------------  08/20/24 Ernie Iraheta [id number]  ---------------------------------------------------------      IMPRESSION XR KNEE RIGHT 6/27/2024:  Normal radiographs of the right knee        Signed by: Rommel Rosario 6/27/2024 6:25 PM  Dictation workstation:   [id number]  ---------------------------------------------------------  [id number]  ---------------------------------------------------------    IMPRESSION KNEE RT MRI Jul Ernie Iraheta \"Brady\"  YOB: 1955  Procedure: Supartz Injection 3/5  Needed Supplies Available: Correct Supplies  Laterality: Right knees  Site Verified and Marked: Correct Site(s) Marked  Timeout Performed by Provider: Dr. Kane Nieves D.O.  Staff Initials:     Kane Nieves D.O. [place], Director of Sports Medicine    LIGIA CAMPOS on 7/1/25 at 7:26 AM.     INDIA Ruano DO     supartz performed  into the right knee under ultrasound

## (undated) DEVICE — GLV 7 PROTEXIS (WHITE)

## (undated) DEVICE — TUBING FLUID ADMINISTRATION SET PRIM 70"

## (undated) DEVICE — WARMING BLANKET UPPER ADULT

## (undated) DEVICE — SOL IRR BAG NS 0.9% 1000ML

## (undated) DEVICE — AVETA CORAL HYSTEROSCOPE 4.6MM DISP

## (undated) DEVICE — GLV 6.5 PROTEXIS (WHITE)

## (undated) DEVICE — PRESSURE INFUSOR BAG 1000ML

## (undated) DEVICE — DRSG TELFA 3 X 8

## (undated) DEVICE — SPECIMEN CONTAINER 100ML

## (undated) DEVICE — SOL INJ NS 0.9% 500ML 1-PORT

## (undated) DEVICE — POSITIONER FOAM EGG CRATE ULNAR 2PCS (PINK)

## (undated) DEVICE — DRAPE LIGHT HANDLE COVER (GREEN)

## (undated) DEVICE — AVETA FLUID MANAGEMENT ACCESSORY

## (undated) DEVICE — AVETA FLUID MANAGEMENT ACCESSORY W CAP

## (undated) DEVICE — SOL IRR POUR NS 0.9% 500ML

## (undated) DEVICE — ELCTR PLASMA LOOP MEDIUM 24FR 12-16 DEG

## (undated) DEVICE — PACK LITHOTOMY

## (undated) DEVICE — ELCTR CUTTING LOOP 24FR 12 DEG 0.35 WIRE

## (undated) DEVICE — VENODYNE/SCD SLEEVE CALF MEDIUM

## (undated) DEVICE — SOL IRR GLYCINE 1.5% 3000L

## (undated) DEVICE — PREP BETADINE KIT

## (undated) DEVICE — TUBING STRYKER HYSTEROSCOPY INFLOW OUTFLOW

## (undated) DEVICE — GLV 6.5 PROTEXIS (BLUE)

## (undated) DEVICE — NSA-STRYKER VIDEO TOWER: Type: DURABLE MEDICAL EQUIPMENT